# Patient Record
Sex: FEMALE | Race: BLACK OR AFRICAN AMERICAN | NOT HISPANIC OR LATINO | Employment: UNEMPLOYED | URBAN - METROPOLITAN AREA
[De-identification: names, ages, dates, MRNs, and addresses within clinical notes are randomized per-mention and may not be internally consistent; named-entity substitution may affect disease eponyms.]

---

## 2016-12-09 LAB
EXTERNAL ABO GROUPING: NORMAL
EXTERNAL ANTIBODY SCREEN: NEGATIVE
EXTERNAL HEPATITIS B SURFACE ANTIGEN: NEGATIVE
EXTERNAL RH FACTOR: POSITIVE
EXTERNAL RUBELLA QUALITATIVE: NORMAL
EXTERNAL SYPHILIS RPR SCREEN: NORMAL
EXTERNAL URINE DRUG SCREEN: NEGATIVE
HIV1 AB SPEC QL IA.RAPID: NEGATIVE

## 2017-01-05 RX ORDER — LANCETS
EACH MISCELLANEOUS
Qty: 1 EACH | Refills: 5 | Status: SHIPPED | OUTPATIENT
Start: 2017-01-05 | End: 2017-01-05 | Stop reason: SDUPTHER

## 2017-01-05 RX ORDER — LANCETS
EACH MISCELLANEOUS
Qty: 1 EACH | Refills: 5 | Status: SHIPPED | OUTPATIENT
Start: 2017-01-05 | End: 2017-01-13 | Stop reason: SDUPTHER

## 2017-01-10 ENCOUNTER — HOSPITAL ENCOUNTER (OUTPATIENT)
Dept: WOMENS IMAGING | Facility: HOSPITAL | Age: 22
Discharge: HOME | End: 2017-01-10
Attending: OBSTETRICS & GYNECOLOGY | Admitting: OBSTETRICS & GYNECOLOGY

## 2017-01-10 ENCOUNTER — OFFICE VISIT (OUTPATIENT)
Dept: OBSTETRICS AND GYNECOLOGY | Facility: HOSPITAL | Age: 22
End: 2017-01-10

## 2017-01-10 VITALS — WEIGHT: 207.8 LBS | DIASTOLIC BLOOD PRESSURE: 61 MMHG | SYSTOLIC BLOOD PRESSURE: 105 MMHG | BODY MASS INDEX: 36.81 KG/M2

## 2017-01-10 DIAGNOSIS — E11.9 TYPE 2 DIABETES MELLITUS WITHOUT COMPLICATION, WITHOUT LONG-TERM CURRENT USE OF INSULIN (HCC): ICD-10-CM

## 2017-01-10 DIAGNOSIS — O24.112 TYPE 2 DIABETES MELLITUS AFFECTING PREGNANCY IN SECOND TRIMESTER, ANTEPARTUM: ICD-10-CM

## 2017-01-10 DIAGNOSIS — Z98.891 HISTORY OF CESAREAN DELIVERY: Primary | ICD-10-CM

## 2017-01-10 DIAGNOSIS — O09.891 SHORT INTERVAL BETWEEN PREGNANCIES AFFECTING PREGNANCY IN FIRST TRIMESTER, ANTEPARTUM: ICD-10-CM

## 2017-01-10 PROCEDURE — 76811 OB US DETAILED SNGL FETUS: CPT | Performed by: OBSTETRICS & GYNECOLOGY

## 2017-01-10 PROCEDURE — 76811 OB US DETAILED SNGL FETUS: CPT

## 2017-01-10 NOTE — PROGRESS NOTES
Documentation of the ultrasound findings, images, and interpretations will be available in the patient's ViewPoint report located in the chart review imaging tab in [employer].

## 2017-01-10 NOTE — MR AVS SNAPSHOT
Jeanine Tabor   1/10/2017 9:00 AM   Office Visit    Dept Phone:  [phone]   Encounter #:  [id number]    Provider:  Glen Virk MD   Department:  [facility]                Your Full Care Plan              Today's Medication Changes          These changes are accurate as of: 1/10/17 10:20 AM.  If you have any questions, ask your nurse or doctor.               Medication(s)that have changed:     metFORMIN  MG 24 hr tablet   Commonly known as:  GLUCOPHAGE-XR   Take 1 tablet by mouth Daily With Breakfast.   What changed:  Another medication with the same name was removed. Continue taking this medication, and follow the directions you see here.   Changed by:  Ulices Thomas MD         Stop taking medication(s)listed here:     ferrous sulfate 325 (65 FE) MG tablet   Stopped by:  Glen Virk MD           ibuprofen 600 MG tablet   Commonly known as:  ADVIL,MOTRIN   Stopped by:  Glen Virk MD                      Your Updated Medication List          This list is accurate as of: 1/10/17 10:20 AM.  Always use your most recent med list.                ACCU-CHEK FASTCLIX LANCETS misc   Finger sticks to be done four times daily       ACCU-CHEK SMARTVIEW test strip   Generic drug:  glucose blood   Finger sticks four times daily       metFORMIN  MG 24 hr tablet   Commonly known as:  GLUCOPHAGE-XR   Take 1 tablet by mouth Daily With Breakfast.               You Were Diagnosed With        Codes Comments    History of  delivery    -  Primary ICD-10-CM: Z98.891  ICD-9-CM: V45.89     Short interval between pregnancies affecting pregnancy in first trimester, antepartum     ICD-10-CM: O09.891  ICD-9-CM: V23.89     Type 2 diabetes mellitus affecting pregnancy in second trimester, antepartum     ICD-10-CM: O24.112  ICD-9-CM: 648.03, 250.00       Instructions     None    Patient Instructions History      Upcoming Appointments     Visit Type Date Time Department    OB FOLLOWUP 1/10/2017 11:30 AM [facility] [place]    US [facility] 1/10/2017  8:45 AM  [facility] US PER DIAG CTR    NEW PATIENT 1/10/2017  9:00 AM [facility] [place]    FOLLOW UP 2017 10:45 AM [facility] [place]      MyChart Signup     [facility] [employer] allows you to send messages to your doctor, view your test results, renew your prescriptions, schedule appointments, and more. To sign up, go to [employer] and click on the Sign Up Now link in the New User? box. Enter your [employer] Activation Code exactly as it appears below along with the last four digits of your Social Security Number and your Date of Birth () to complete the sign-up process. If you do not sign up before the expiration date, you must request a new code.    [employer] Activation Code: [email or web address]  Expires: 2017 10:20 AM    If you have questions, you can email [email or web address] or call [phone] to talk to our [employer] staff. Remember, [employer] is NOT to be used for urgent needs. For medical emergencies, dial 911.               Other Info from Your Visit           Your Appointments     [place] 10, 2017 11:30 AM EST   OB FOLLOWUP with Ulices Thomas MD   [facility] OBSTETRICS AND GYNECOLOGY (--)    [address]  [facility] [address] [phone]            2017 10:45 AM EST   Follow Up with  [facility] DEPT SCHEDULE   [facility] (--)    [address]  [facility] [address]   [phone]           Arrive 15 minutes prior to appointment.              Allergies     No Known Allergies      Reason for Visit     Pregnancy Ultrasound T2DM; Previous C/S x2; Short pregnancy interval      Vital Signs     Blood Pressure Weight Last Menstrual Period Body Mass Index Smoking Status       105/61 207 lb 12.8 oz (94.3 kg) 2016 36.81 kg/m2 Never Smoker       Problems and Diagnoses Noted     Previous  ( delivery)    Short interval between  pregnancies affecting pregnancy in first trimester, antepartum    Type 2 diabetes mellitus affecting pregnancy in second trimester, antepartum

## 2017-01-10 NOTE — PROGRESS NOTES
Denies problems with pregnancy. Had Ten Sleep with low risk results. Admits did not check fingerstick glucose values prior to pregnancy and still not because she doesn't have testing supplies. Prescriptions may have been sent to a different pharmacy; she agrees to follow-up with Dr. Thomas regarding supplies and testing.

## 2017-01-13 ENCOUNTER — ROUTINE PRENATAL (OUTPATIENT)
Dept: OBSTETRICS AND GYNECOLOGY | Facility: CLINIC | Age: 22
End: 2017-01-13

## 2017-01-13 VITALS — DIASTOLIC BLOOD PRESSURE: 74 MMHG | SYSTOLIC BLOOD PRESSURE: 122 MMHG | BODY MASS INDEX: 37.2 KG/M2 | WEIGHT: 210 LBS

## 2017-01-13 DIAGNOSIS — Z98.891 HISTORY OF CESAREAN DELIVERY: Primary | ICD-10-CM

## 2017-01-13 PROCEDURE — 99213 OFFICE O/P EST LOW 20 MIN: CPT | Performed by: OBSTETRICS & GYNECOLOGY

## 2017-01-13 RX ORDER — METFORMIN HYDROCHLORIDE 500 MG/1
500 TABLET, EXTENDED RELEASE ORAL
Qty: 90 TABLET | Refills: 2 | Status: SHIPPED | OUTPATIENT
Start: 2017-01-13 | End: 2017-06-09 | Stop reason: HOSPADM

## 2017-01-13 RX ORDER — LANCETS
EACH MISCELLANEOUS
Qty: 1 EACH | Refills: 5 | Status: SHIPPED | OUTPATIENT
Start: 2017-01-13

## 2017-01-13 RX ORDER — BLOOD SUGAR DIAGNOSTIC
STRIP MISCELLANEOUS
Qty: 1 EACH | Refills: 6 | Status: SHIPPED | OUTPATIENT
Start: 2017-01-13 | End: 2017-06-09 | Stop reason: HOSPADM

## 2017-01-13 NOTE — PROGRESS NOTES
Chief Complaint   Patient presents with   • Routine Prenatal Visit     No concern this visit.       HPI: Jeanine is a  currently at 18w3d who today reports the following:Headache - No ; Visual change - No ; Swelling in legs - No ; Nausea - No ; Constipation - No; Diarrhea - No ; Contractions - No ; Leaking fluid - No ; Vaginal bleeding -  No.      ROS: Constitutional: negative for fever, chills, activity change, appetite change, fatigue and unexpected weight change.  Respiratory: negative  Cardiovascular: negative  Gastrointestinal: negative  Genitourinary:negative  Vitals: See prenatal flowsheet     EXAM:  Abdomen: See prenatal flowsheet   Urine glucose/protein: See prenatal flowsheet   Pelvic: See prenatal flowsheet     Prenatal Labs  Lab Results   Component Value Date    HGB 12.9 2016    RUBELLAIGGIN Immune 2016    HEPBSAG Non-Reactive 2016    LABRPR Non-reactive 10/23/2015    ABORH B Rh Positive 2016    ABO B 2016    RH Positive 2016    ABSCRN Negative 2016    LABANTI Negative 10/23/2015    [id number] NonReactive 10/23/2015    [id number] Non-Reactive 2016       MDM:  Impression:Diabetes mellitus  Tests done today: none  Specific topics discussed at today's visit: glucose monitoroing  Next visit:U/S to complete the anatomic screening

## 2017-01-13 NOTE — MR AVS SNAPSHOT
Jeanine Tabor   2017 11:20 AM   Routine Prenatal    Dept Phone:  [phone]   Encounter #:  [id number]    Provider:  Ulices Thomas MD   Department:  [facility] OBSTETRICS AND GYNECOLOGY                Your Full Care Plan              Where to Get Your Medications      [facility] [place] [address] - [address] [place] [address] - [phone]  - [phone] [address] [address]     Phone:  [phone]     ACCU-CHEK FASTCLIX LANCETS misc    ACCU-CHEK SMARTVIEW test strip    metFORMIN  MG 24 hr tablet            Your Updated Medication List          This list is accurate as of: 17 12:30 PM.  Always use your most recent med list.                ACCU-CHEK FASTCLIX LANCETS misc   Finger sticks to be done four times daily       ACCU-CHEK SMARTVIEW test strip   Generic drug:  glucose blood   Finger sticks four times daily       metFORMIN  MG 24 hr tablet   Commonly known as:  GLUCOPHAGE-XR   Take 1 tablet by mouth Daily With Breakfast.               You Were Diagnosed With        Codes Comments    History of  delivery    -  Primary ICD-10-CM: Z98.891  ICD-9-CM: V45.89       Instructions     None    Patient Instructions History      Upcoming Appointments     Visit Type Date Time Department    OB FOLLOWUP 2017 11:20 AM [facility] [place]    FOLLOW UP 2017 10:45 AM [facility] [place]    [facility] 2017 11:00 AM  [facility] US PER [facility]      [employer] Signup     [facility] [employer] allows you to send messages to your doctor, view your test results, renew your prescriptions, schedule appointments, and more. To sign up, go to [employer] and click on the Sign Up Now link in the New User? box. Enter your [employer] Activation Code exactly as it appears below along with the last four digits of your Social Security Number and your Date of Birth () to complete the  sign-up process. If you do not sign up before the expiration date, you must request a new code.    [employer] Activation Code: [email or web address]  Expires: 2017 10:20 AM    If you have questions, you can email [email or web address] or call [phone] to talk to our [employer] staff. Remember, [employer] is NOT to be used for urgent needs. For medical emergencies, dial 911.               Other Info from Your Visit           Your Appointments     2017 10:45 AM EST   Follow Up with  [facility] DEPT SCHEDULE   [facility] (--)    [address] [id number] [phone]           Arrive 15 minutes prior to appointment.            2017 11:00 AM EST   US cecy pdc with [facility] US 1   [facility] PER DIAG CTR ([place])    [address] [address] [address] [phone]           Patient is to be hydrated              Allergies     No Known Allergies      Reason for Visit     Routine Prenatal Visit No concern this visit.      Vital Signs     Blood Pressure Weight Last Menstrual Period Body Mass Index Smoking Status       122/74 210 lb (95.3 kg) 2016 37.2 kg/m2 Never Smoker       Problems and Diagnoses Noted     Previous  ( delivery)

## 2017-02-07 ENCOUNTER — ROUTINE PRENATAL (OUTPATIENT)
Dept: OBSTETRICS AND GYNECOLOGY | Facility: CLINIC | Age: 22
End: 2017-02-07

## 2017-02-07 ENCOUNTER — HOSPITAL ENCOUNTER (OUTPATIENT)
Dept: WOMENS IMAGING | Facility: HOSPITAL | Age: 22
Discharge: HOME | End: 2017-02-07
Attending: OBSTETRICS & GYNECOLOGY | Admitting: OBSTETRICS & GYNECOLOGY

## 2017-02-07 ENCOUNTER — OFFICE VISIT (OUTPATIENT)
Dept: OBSTETRICS AND GYNECOLOGY | Facility: HOSPITAL | Age: 22
End: 2017-02-07

## 2017-02-07 VITALS — SYSTOLIC BLOOD PRESSURE: 108 MMHG | WEIGHT: 211.6 LBS | DIASTOLIC BLOOD PRESSURE: 54 MMHG | BODY MASS INDEX: 37.48 KG/M2

## 2017-02-07 VITALS — BODY MASS INDEX: 37.38 KG/M2 | WEIGHT: 211 LBS | SYSTOLIC BLOOD PRESSURE: 118 MMHG | DIASTOLIC BLOOD PRESSURE: 84 MMHG

## 2017-02-07 DIAGNOSIS — O24.112 TYPE 2 DIABETES MELLITUS AFFECTING PREGNANCY IN SECOND TRIMESTER, ANTEPARTUM: ICD-10-CM

## 2017-02-07 DIAGNOSIS — Z98.891 HISTORY OF CESAREAN DELIVERY: Primary | ICD-10-CM

## 2017-02-07 DIAGNOSIS — Z98.891 HISTORY OF CESAREAN DELIVERY: ICD-10-CM

## 2017-02-07 DIAGNOSIS — O09.891 SHORT INTERVAL BETWEEN PREGNANCIES AFFECTING PREGNANCY IN FIRST TRIMESTER, ANTEPARTUM: ICD-10-CM

## 2017-02-07 DIAGNOSIS — O24.112 TYPE 2 DIABETES MELLITUS AFFECTING PREGNANCY IN SECOND TRIMESTER, ANTEPARTUM: Primary | ICD-10-CM

## 2017-02-07 DIAGNOSIS — Z82.79 FAMILY HISTORY OF CONGENITAL HEART DEFECT: ICD-10-CM

## 2017-02-07 PROCEDURE — 76825 ECHO EXAM OF FETAL HEART: CPT

## 2017-02-07 PROCEDURE — 93325 DOPPLER ECHO COLOR FLOW MAPG: CPT

## 2017-02-07 PROCEDURE — 76825 ECHO EXAM OF FETAL HEART: CPT | Performed by: OBSTETRICS & GYNECOLOGY

## 2017-02-07 PROCEDURE — 99213 OFFICE O/P EST LOW 20 MIN: CPT | Performed by: OBSTETRICS & GYNECOLOGY

## 2017-02-07 PROCEDURE — 76827 ECHO EXAM OF FETAL HEART: CPT

## 2017-02-07 PROCEDURE — 93325 DOPPLER ECHO COLOR FLOW MAPG: CPT | Performed by: OBSTETRICS & GYNECOLOGY

## 2017-02-07 RX ORDER — PRENATAL WITH FERROUS FUM AND FOLIC ACID 3080; 920; 120; 400; 22; 1.84; 3; 20; 10; 1; 12; 200; 27; 25; 2 [IU]/1; [IU]/1; MG/1; [IU]/1; MG/1; MG/1; MG/1; MG/1; MG/1; MG/1; UG/1; MG/1; MG/1; MG/1; MG/1
1 TABLET ORAL DAILY
COMMUNITY
End: 2017-08-07

## 2017-02-07 NOTE — PROGRESS NOTES
Chief Complaint   Patient presents with   • Routine Prenatal Visit     OB Patient. Was seen at [facility] today. C/o Headaches off and on has not tried any meds for relief.        HPI: Jeanine is a  currently at 22w0d who today reports the following:Headache - No ; Visual change - No ; Swelling in legs - No ; Nausea - No ; Constipation - No; Diarrhea - No ; Contractions - No ; Leaking fluid - No ; Vaginal bleeding -  No.      ROS: Constitutional: negative for fever, chills, activity change, appetite change, fatigue and unexpected weight change.  Respiratory: negative  Cardiovascular: negative  Gastrointestinal: negative  Genitourinary:negative  Hematologic/lymphatic: negative  Musculoskeletal:negative  Vitals: See prenatal flowsheet     EXAM:  Abdomen: See prenatal flowsheet   Urine glucose/protein: See prenatal flowsheet   Pelvic: See prenatal flowsheet     Prenatal Labs  Lab Results   Component Value Date    HGB 12.9 2016    RUBELLAIGGIN Immune 2016    HEPBSAG Non-Reactive 2016    LABRPR Non-reactive 10/23/2015    ABORH B Rh Positive 2016    ABO B 2016    RH Positive 2016    ABSCRN Negative 2016    LABANTI Negative 10/23/2015    [id number] NonReactive 10/23/2015    ARQ5QBK3 Non-Reactive 2016       MDM:  Impression:Uncomplicated multigravida and Diabetes mellitus  Tests done today: U/S to complete the anatomic screening   Specific topics discussed at today's visit: glucose control and diet  Next visit:none, PDC six weeks

## 2017-03-07 ENCOUNTER — ROUTINE PRENATAL (OUTPATIENT)
Dept: OBSTETRICS AND GYNECOLOGY | Facility: CLINIC | Age: 22
End: 2017-03-07

## 2017-03-07 VITALS — SYSTOLIC BLOOD PRESSURE: 120 MMHG | DIASTOLIC BLOOD PRESSURE: 66 MMHG | WEIGHT: 212 LBS | BODY MASS INDEX: 37.55 KG/M2

## 2017-03-07 DIAGNOSIS — O24.112 TYPE 2 DIABETES MELLITUS AFFECTING PREGNANCY IN SECOND TRIMESTER, ANTEPARTUM: Primary | ICD-10-CM

## 2017-03-07 DIAGNOSIS — Z98.891 HISTORY OF CESAREAN DELIVERY: ICD-10-CM

## 2017-03-07 DIAGNOSIS — Z82.79 FAMILY HISTORY OF CONGENITAL HEART DEFECT: ICD-10-CM

## 2017-03-07 PROCEDURE — 99213 OFFICE O/P EST LOW 20 MIN: CPT | Performed by: OBSTETRICS & GYNECOLOGY

## 2017-03-07 NOTE — PROGRESS NOTES
Chief Complaint   Patient presents with   • Routine Prenatal Visit     ob visit        HPI: Jeanine is a  currently at 26w0d who today reports the following:Headache - No ; Visual change - No ; Swelling in legs - No ; Nausea - No ; Constipation - No; Diarrhea - No ; Contractions - No ; Leaking fluid - No ; Vaginal bleeding -  No.      ROS: Constitutional: negative for fever, chills, activity change, appetite change, fatigue and unexpected weight change.  Ears, nose, mouth, throat, and face: negative  Respiratory: negative  Cardiovascular: negative  Gastrointestinal: negative  Genitourinary:negative  Vitals: See prenatal flowsheet     EXAM:  Abdomen: See prenatal flowsheet   Urine glucose/protein: See prenatal flowsheet   Pelvic: See prenatal flowsheet     Prenatal Labs  Lab Results   Component Value Date    HGB 12.9 2016    RUBELLAIGGIN Immune 2016    HEPBSAG Non-Reactive 2016    LABRPR Non-reactive 10/23/2015    ABORH B Rh Positive 2016    ABO B 2016    RH Positive 2016    ABSCRN Negative 2016    LABANTI Negative 10/23/2015    [id number] NonReactive 10/23/2015    [id number] Non-Reactive 2016       MDM:  Impression:Uncomplicated multigravida, Previous C/S with planned repeat C/S and Diabetes mellitus  Tests done today: none  Specific topics discussed at today's visit: none - she had no major complaints,questions or concerns  Next visit:U/S for EFW

## 2017-03-21 ENCOUNTER — OFFICE VISIT (OUTPATIENT)
Dept: OBSTETRICS AND GYNECOLOGY | Facility: HOSPITAL | Age: 22
End: 2017-03-21

## 2017-03-21 ENCOUNTER — HOSPITAL ENCOUNTER (OUTPATIENT)
Dept: WOMENS IMAGING | Facility: HOSPITAL | Age: 22
Discharge: HOME | End: 2017-03-21
Attending: OBSTETRICS & GYNECOLOGY | Admitting: OBSTETRICS & GYNECOLOGY

## 2017-03-21 VITALS — BODY MASS INDEX: 38.09 KG/M2 | DIASTOLIC BLOOD PRESSURE: 58 MMHG | WEIGHT: 215 LBS | SYSTOLIC BLOOD PRESSURE: 116 MMHG

## 2017-03-21 DIAGNOSIS — Z98.891 HISTORY OF CESAREAN DELIVERY: ICD-10-CM

## 2017-03-21 DIAGNOSIS — O24.112 TYPE 2 DIABETES MELLITUS AFFECTING PREGNANCY IN SECOND TRIMESTER, ANTEPARTUM: Primary | ICD-10-CM

## 2017-03-21 DIAGNOSIS — O24.112 TYPE 2 DIABETES MELLITUS AFFECTING PREGNANCY IN SECOND TRIMESTER, ANTEPARTUM: ICD-10-CM

## 2017-03-21 DIAGNOSIS — O09.891 SHORT INTERVAL BETWEEN PREGNANCIES AFFECTING PREGNANCY IN FIRST TRIMESTER, ANTEPARTUM: ICD-10-CM

## 2017-03-21 DIAGNOSIS — Z82.79 FAMILY HISTORY OF CONGENITAL HEART DEFECT: ICD-10-CM

## 2017-03-21 PROCEDURE — 76816 OB US FOLLOW-UP PER FETUS: CPT

## 2017-03-21 PROCEDURE — 76816 OB US FOLLOW-UP PER FETUS: CPT | Performed by: OBSTETRICS & GYNECOLOGY

## 2017-03-21 NOTE — PROGRESS NOTES
"Pt denies all s/s PTL, \"reporting glucose readings to Dr. Thomas\", states \"fasting= and post meals 120-130\",to see William couch, [place]=low prob, male  "

## 2017-03-21 NOTE — PROGRESS NOTES
Documentation of the ultasound findings, images, and interpretations with be available in the patient's Viewpoint report located in the Chart Review Imaging tab in [employer].

## 2017-03-28 ENCOUNTER — ROUTINE PRENATAL (OUTPATIENT)
Dept: OBSTETRICS AND GYNECOLOGY | Facility: CLINIC | Age: 22
End: 2017-03-28

## 2017-03-28 VITALS — WEIGHT: 215 LBS | DIASTOLIC BLOOD PRESSURE: 78 MMHG | SYSTOLIC BLOOD PRESSURE: 120 MMHG | BODY MASS INDEX: 38.09 KG/M2

## 2017-03-28 DIAGNOSIS — Z98.891 HISTORY OF CESAREAN DELIVERY: Primary | ICD-10-CM

## 2017-03-28 DIAGNOSIS — O24.112 TYPE 2 DIABETES MELLITUS AFFECTING PREGNANCY IN SECOND TRIMESTER, ANTEPARTUM: ICD-10-CM

## 2017-03-28 DIAGNOSIS — Z3A.29 29 WEEKS GESTATION OF PREGNANCY: ICD-10-CM

## 2017-03-28 PROCEDURE — 99213 OFFICE O/P EST LOW 20 MIN: CPT | Performed by: OBSTETRICS & GYNECOLOGY

## 2017-03-28 NOTE — PROGRESS NOTES
Chief Complaint   Patient presents with   • Routine Prenatal Visit     ob visit patient wants to sign tubal forms       HPI: Jeanine is a  currently at 29w0d who today reports the following:Headache - No ; Visual change - No ; Swelling in legs - No ; Nausea - No ; Constipation - No; Diarrhea - No ; Contractions - No ; Leaking fluid - No ; Vaginal bleeding -  No.      ROS: Constitutional: negative for fever, chills, activity change, appetite change, fatigue and unexpected weight change.  Ears, nose, mouth, throat, and face: negative  Respiratory: negative  Cardiovascular: negative  Gastrointestinal: negative  Genitourinary:negative  Vitals: See prenatal flowsheet     EXAM:  Abdomen: See prenatal flowsheet   Urine glucose/protein: See prenatal flowsheet   Pelvic: See prenatal flowsheet     Prenatal Labs  Lab Results   Component Value Date    HGB 12.9 2016    RUBELLAIGGIN Immune 2016    HEPBSAG Non-Reactive 2016    LABRPR Non-reactive 10/23/2015    ABORH B Rh Positive 2016    ABO B 2016    RH Positive 2016    ABSCRN Negative 2016    LABANTI Negative 10/23/2015    [id number] NonReactive 10/23/2015    [id number] Non-Reactive 2016       MDM:  Impression:Previous C/S with planned repeat C/S, Diabetes mellitus and desires sterilzation  Tests done today: none  Specific topics discussed at today's visit: glucose control, sterilization  Next visit:U/S to complete the anatomic screening

## 2017-04-18 ENCOUNTER — OFFICE VISIT (OUTPATIENT)
Dept: OBSTETRICS AND GYNECOLOGY | Facility: HOSPITAL | Age: 22
End: 2017-04-18

## 2017-04-18 ENCOUNTER — HOSPITAL ENCOUNTER (OUTPATIENT)
Dept: WOMENS IMAGING | Facility: HOSPITAL | Age: 22
Discharge: HOME | End: 2017-04-18
Attending: OBSTETRICS & GYNECOLOGY | Admitting: OBSTETRICS & GYNECOLOGY

## 2017-04-18 ENCOUNTER — ROUTINE PRENATAL (OUTPATIENT)
Dept: OBSTETRICS AND GYNECOLOGY | Facility: CLINIC | Age: 22
End: 2017-04-18

## 2017-04-18 VITALS — SYSTOLIC BLOOD PRESSURE: 116 MMHG | DIASTOLIC BLOOD PRESSURE: 59 MMHG | BODY MASS INDEX: 38.09 KG/M2 | WEIGHT: 215 LBS

## 2017-04-18 VITALS — DIASTOLIC BLOOD PRESSURE: 62 MMHG | SYSTOLIC BLOOD PRESSURE: 110 MMHG | WEIGHT: 215 LBS | BODY MASS INDEX: 38.09 KG/M2

## 2017-04-18 DIAGNOSIS — O24.113 TYPE 2 DIABETES MELLITUS AFFECTING PREGNANCY IN THIRD TRIMESTER, ANTEPARTUM: Primary | ICD-10-CM

## 2017-04-18 DIAGNOSIS — Z82.79 FAMILY HISTORY OF CONGENITAL HEART DEFECT: ICD-10-CM

## 2017-04-18 DIAGNOSIS — O09.891 SHORT INTERVAL BETWEEN PREGNANCIES AFFECTING PREGNANCY IN FIRST TRIMESTER, ANTEPARTUM: ICD-10-CM

## 2017-04-18 DIAGNOSIS — O24.113 TYPE 2 DIABETES MELLITUS AFFECTING PREGNANCY IN THIRD TRIMESTER, ANTEPARTUM: ICD-10-CM

## 2017-04-18 DIAGNOSIS — O24.112 TYPE 2 DIABETES MELLITUS AFFECTING PREGNANCY IN SECOND TRIMESTER, ANTEPARTUM: ICD-10-CM

## 2017-04-18 DIAGNOSIS — Z98.891 HISTORY OF CESAREAN DELIVERY: Primary | ICD-10-CM

## 2017-04-18 PROBLEM — Z3A.32 32 WEEKS GESTATION OF PREGNANCY: Status: ACTIVE | Noted: 2017-03-28

## 2017-04-18 PROCEDURE — 76816 OB US FOLLOW-UP PER FETUS: CPT

## 2017-04-18 PROCEDURE — 76816 OB US FOLLOW-UP PER FETUS: CPT | Performed by: OBSTETRICS & GYNECOLOGY

## 2017-04-18 PROCEDURE — 99213 OFFICE O/P EST LOW 20 MIN: CPT | Performed by: OBSTETRICS & GYNECOLOGY

## 2017-04-18 NOTE — PROGRESS NOTES
Chief Complaint   Patient presents with   • Routine Prenatal Visit     ob visit        HPI: Jeanine is a  currently at 32w0d who today reports the following:Headache - No ; Visual change - No ; Swelling in legs - No ; Nausea - No ; Constipation - No; Diarrhea - No ; Contractions - No ; Leaking fluid - No ; Vaginal bleeding -  No.      ROS: A comprehensive review of systems was negative.  Vitals: See prenatal flowsheet     EXAM:  Abdomen: See prenatal flowsheet   Urine glucose/protein: See prenatal flowsheet   Pelvic: See prenatal flowsheet     Prenatal Labs  Lab Results   Component Value Date    HGB 12.9 2016    RUBELLAIGGIN Immune 2016    HEPBSAG Non-Reactive 2016    LABRPR Non-reactive 10/23/2015    ABORH B Rh Positive 2016    ABO B 2016    RH Positive 2016    ABSCRN Negative 2016    LABANTI Negative 10/23/2015    [id number] NonReactive 10/23/2015    [id number] Non-Reactive 2016       MDM:  Impression:Previous C/S with planned repeat C/S and Diabetes mellitus  Tests done today: U/S for EFW   Specific topics discussed at today's visit: birth plan  glucose monitoring. fetal well-being testing, delivery  Next visit:NST

## 2017-04-18 NOTE — PROGRESS NOTES
"Pt reports that \"she fell yesterday on rt hip/side\". Denies \"bleeding/vaginal fluid leakage or regular UC's and +fetal movement\". States \"called William yesterday after fall and was told ok to wait til today unless developed any s/s PTL,\". Reports \"fasting glucoses in 90's and post meals 130's\". Reports William managing DM. To see William next.  "

## 2017-04-21 ENCOUNTER — RESULTS ENCOUNTER (OUTPATIENT)
Dept: OBSTETRICS AND GYNECOLOGY | Facility: CLINIC | Age: 22
End: 2017-04-21

## 2017-04-21 DIAGNOSIS — O24.113 TYPE 2 DIABETES MELLITUS AFFECTING PREGNANCY IN THIRD TRIMESTER, ANTEPARTUM: ICD-10-CM

## 2017-04-25 ENCOUNTER — RESULTS ENCOUNTER (OUTPATIENT)
Dept: OBSTETRICS AND GYNECOLOGY | Facility: CLINIC | Age: 22
End: 2017-04-25

## 2017-04-25 DIAGNOSIS — O24.113 TYPE 2 DIABETES MELLITUS AFFECTING PREGNANCY IN THIRD TRIMESTER, ANTEPARTUM: ICD-10-CM

## 2017-04-28 ENCOUNTER — RESULTS ENCOUNTER (OUTPATIENT)
Dept: OBSTETRICS AND GYNECOLOGY | Facility: CLINIC | Age: 22
End: 2017-04-28

## 2017-04-28 DIAGNOSIS — O24.113 TYPE 2 DIABETES MELLITUS AFFECTING PREGNANCY IN THIRD TRIMESTER, ANTEPARTUM: ICD-10-CM

## 2017-05-02 ENCOUNTER — ROUTINE PRENATAL (OUTPATIENT)
Dept: OBSTETRICS AND GYNECOLOGY | Facility: CLINIC | Age: 22
End: 2017-05-02

## 2017-05-02 ENCOUNTER — RESULTS ENCOUNTER (OUTPATIENT)
Dept: OBSTETRICS AND GYNECOLOGY | Facility: CLINIC | Age: 22
End: 2017-05-02

## 2017-05-02 VITALS — DIASTOLIC BLOOD PRESSURE: 60 MMHG | WEIGHT: 213 LBS | SYSTOLIC BLOOD PRESSURE: 110 MMHG | BODY MASS INDEX: 37.73 KG/M2

## 2017-05-02 DIAGNOSIS — Z3A.34 34 WEEKS GESTATION OF PREGNANCY: ICD-10-CM

## 2017-05-02 DIAGNOSIS — Z98.891 HISTORY OF CESAREAN DELIVERY: Primary | ICD-10-CM

## 2017-05-02 DIAGNOSIS — O24.113 TYPE 2 DIABETES MELLITUS AFFECTING PREGNANCY IN THIRD TRIMESTER, ANTEPARTUM: ICD-10-CM

## 2017-05-02 LAB
ACCELERATIONS > 15 BPM: 3
NST ASSESSMENT: REACTIVE
NST BASELINE: 140
NST DURATION: 20 MINUTES
NST INDICATIONS: NORMAL
NST LOCATIONS: NORMAL
NST READ BY: NORMAL
NST RETURN: NORMAL

## 2017-05-02 PROCEDURE — 99212 OFFICE O/P EST SF 10 MIN: CPT | Performed by: OBSTETRICS & GYNECOLOGY

## 2017-05-02 PROCEDURE — 59025 FETAL NON-STRESS TEST: CPT | Performed by: OBSTETRICS & GYNECOLOGY

## 2017-05-05 ENCOUNTER — RESULTS ENCOUNTER (OUTPATIENT)
Dept: OBSTETRICS AND GYNECOLOGY | Facility: CLINIC | Age: 22
End: 2017-05-05

## 2017-05-05 DIAGNOSIS — O24.113 TYPE 2 DIABETES MELLITUS AFFECTING PREGNANCY IN THIRD TRIMESTER, ANTEPARTUM: ICD-10-CM

## 2017-05-08 ENCOUNTER — ROUTINE PRENATAL (OUTPATIENT)
Dept: OBSTETRICS AND GYNECOLOGY | Facility: CLINIC | Age: 22
End: 2017-05-08

## 2017-05-08 VITALS — BODY MASS INDEX: 37.73 KG/M2 | WEIGHT: 213 LBS | SYSTOLIC BLOOD PRESSURE: 120 MMHG | DIASTOLIC BLOOD PRESSURE: 68 MMHG

## 2017-05-08 DIAGNOSIS — O24.113 TYPE 2 DIABETES MELLITUS AFFECTING PREGNANCY IN THIRD TRIMESTER, ANTEPARTUM: Primary | ICD-10-CM

## 2017-05-08 LAB
ACCELERATIONS > 15 BPM: 2
ACOUSTIC STIMULATION: NO
NST ASSESSMENT: REACTIVE
NST BASELINE: 135
NST INDICATIONS: NORMAL
NST LOCATIONS: NORMAL
NST READ BY: NORMAL
NST RETURN: NORMAL
UTERINE ACTIVITY: NO

## 2017-05-08 PROCEDURE — 59025 FETAL NON-STRESS TEST: CPT | Performed by: OBSTETRICS & GYNECOLOGY

## 2017-05-08 PROCEDURE — 99213 OFFICE O/P EST LOW 20 MIN: CPT | Performed by: OBSTETRICS & GYNECOLOGY

## 2017-05-09 ENCOUNTER — RESULTS ENCOUNTER (OUTPATIENT)
Dept: OBSTETRICS AND GYNECOLOGY | Facility: CLINIC | Age: 22
End: 2017-05-09

## 2017-05-09 DIAGNOSIS — O24.113 TYPE 2 DIABETES MELLITUS AFFECTING PREGNANCY IN THIRD TRIMESTER, ANTEPARTUM: ICD-10-CM

## 2017-05-10 ENCOUNTER — TELEPHONE (OUTPATIENT)
Dept: OBSTETRICS AND GYNECOLOGY | Facility: CLINIC | Age: 22
End: 2017-05-10

## 2017-05-12 ENCOUNTER — RESULTS ENCOUNTER (OUTPATIENT)
Dept: OBSTETRICS AND GYNECOLOGY | Facility: CLINIC | Age: 22
End: 2017-05-12

## 2017-05-12 DIAGNOSIS — O24.113 TYPE 2 DIABETES MELLITUS AFFECTING PREGNANCY IN THIRD TRIMESTER, ANTEPARTUM: ICD-10-CM

## 2017-05-16 ENCOUNTER — HOSPITAL ENCOUNTER (OUTPATIENT)
Dept: WOMENS IMAGING | Facility: HOSPITAL | Age: 22
Discharge: HOME | End: 2017-05-16
Attending: OBSTETRICS & GYNECOLOGY | Admitting: OBSTETRICS & GYNECOLOGY

## 2017-05-16 ENCOUNTER — OFFICE VISIT (OUTPATIENT)
Dept: OBSTETRICS AND GYNECOLOGY | Facility: HOSPITAL | Age: 22
End: 2017-05-16

## 2017-05-16 ENCOUNTER — RESULTS ENCOUNTER (OUTPATIENT)
Dept: OBSTETRICS AND GYNECOLOGY | Facility: CLINIC | Age: 22
End: 2017-05-16

## 2017-05-16 VITALS — BODY MASS INDEX: 38.37 KG/M2 | SYSTOLIC BLOOD PRESSURE: 111 MMHG | WEIGHT: 216.6 LBS | DIASTOLIC BLOOD PRESSURE: 55 MMHG

## 2017-05-16 DIAGNOSIS — O09.891 SHORT INTERVAL BETWEEN PREGNANCIES AFFECTING PREGNANCY IN FIRST TRIMESTER, ANTEPARTUM: ICD-10-CM

## 2017-05-16 DIAGNOSIS — O24.113 TYPE 2 DIABETES MELLITUS AFFECTING PREGNANCY IN THIRD TRIMESTER, ANTEPARTUM: ICD-10-CM

## 2017-05-16 DIAGNOSIS — O24.113 TYPE 2 DIABETES MELLITUS AFFECTING PREGNANCY IN THIRD TRIMESTER, ANTEPARTUM: Primary | ICD-10-CM

## 2017-05-16 DIAGNOSIS — Z98.891 HISTORY OF CESAREAN DELIVERY: ICD-10-CM

## 2017-05-16 PROBLEM — O24.112 TYPE 2 DIABETES MELLITUS AFFECTING PREGNANCY IN SECOND TRIMESTER, ANTEPARTUM: Status: RESOLVED | Noted: 2017-01-10 | Resolved: 2017-05-16

## 2017-05-16 PROCEDURE — 76819 FETAL BIOPHYS PROFIL W/O NST: CPT

## 2017-05-16 PROCEDURE — 76819 FETAL BIOPHYS PROFIL W/O NST: CPT | Performed by: OBSTETRICS & GYNECOLOGY

## 2017-05-16 PROCEDURE — 76816 OB US FOLLOW-UP PER FETUS: CPT | Performed by: OBSTETRICS & GYNECOLOGY

## 2017-05-16 PROCEDURE — 76816 OB US FOLLOW-UP PER FETUS: CPT

## 2017-05-19 ENCOUNTER — RESULTS ENCOUNTER (OUTPATIENT)
Dept: OBSTETRICS AND GYNECOLOGY | Facility: CLINIC | Age: 22
End: 2017-05-19

## 2017-05-19 DIAGNOSIS — O24.113 TYPE 2 DIABETES MELLITUS AFFECTING PREGNANCY IN THIRD TRIMESTER, ANTEPARTUM: ICD-10-CM

## 2017-05-22 ENCOUNTER — ROUTINE PRENATAL (OUTPATIENT)
Dept: OBSTETRICS AND GYNECOLOGY | Facility: CLINIC | Age: 22
End: 2017-05-22

## 2017-05-22 VITALS — SYSTOLIC BLOOD PRESSURE: 110 MMHG | WEIGHT: 216 LBS | DIASTOLIC BLOOD PRESSURE: 68 MMHG | BODY MASS INDEX: 38.26 KG/M2

## 2017-05-22 DIAGNOSIS — Z34.83 PRENATAL CARE, SUBSEQUENT PREGNANCY, THIRD TRIMESTER: ICD-10-CM

## 2017-05-22 DIAGNOSIS — O24.113 TYPE 2 DIABETES MELLITUS AFFECTING PREGNANCY IN THIRD TRIMESTER, ANTEPARTUM: Primary | ICD-10-CM

## 2017-05-22 LAB
ACCELERATIONS > 15 BPM: 3
ACOUSTIC STIMULATION: NO
NST ASSESSMENT: REACTIVE
NST BASELINE: 135
NST DURATION: 20 MINUTES
NST INDICATIONS: NORMAL
NST LOCATIONS: NORMAL
NST READ BY: NORMAL
NST RETURN: NORMAL
UTERINE ACTIVITY: NO

## 2017-05-22 PROCEDURE — 59025 FETAL NON-STRESS TEST: CPT | Performed by: OBSTETRICS & GYNECOLOGY

## 2017-05-22 PROCEDURE — 99213 OFFICE O/P EST LOW 20 MIN: CPT | Performed by: OBSTETRICS & GYNECOLOGY

## 2017-05-22 RX ORDER — LIDOCAINE HYDROCHLORIDE 10 MG/ML
5 INJECTION, SOLUTION INFILTRATION; PERINEURAL AS NEEDED
Status: CANCELLED | OUTPATIENT
Start: 2017-05-22

## 2017-05-22 RX ORDER — CARBOPROST TROMETHAMINE 250 UG/ML
250 INJECTION, SOLUTION INTRAMUSCULAR AS NEEDED
Status: CANCELLED | OUTPATIENT
Start: 2017-05-22

## 2017-05-22 RX ORDER — SODIUM CHLORIDE 0.9 % (FLUSH) 0.9 %
1-10 SYRINGE (ML) INJECTION AS NEEDED
Status: CANCELLED | OUTPATIENT
Start: 2017-05-22

## 2017-05-22 RX ORDER — METHYLERGONOVINE MALEATE 0.2 MG/ML
200 INJECTION INTRAVENOUS ONCE AS NEEDED
Status: CANCELLED | OUTPATIENT
Start: 2017-05-22

## 2017-05-22 RX ORDER — MISOPROSTOL 100 UG/1
800 TABLET ORAL AS NEEDED
Status: CANCELLED | OUTPATIENT
Start: 2017-05-22

## 2017-05-22 RX ORDER — SODIUM CHLORIDE, SODIUM LACTATE, POTASSIUM CHLORIDE, CALCIUM CHLORIDE 600; 310; 30; 20 MG/100ML; MG/100ML; MG/100ML; MG/100ML
125 INJECTION, SOLUTION INTRAVENOUS CONTINUOUS
Status: CANCELLED | OUTPATIENT
Start: 2017-05-22

## 2017-05-23 ENCOUNTER — RESULTS ENCOUNTER (OUTPATIENT)
Dept: OBSTETRICS AND GYNECOLOGY | Facility: CLINIC | Age: 22
End: 2017-05-23

## 2017-05-23 DIAGNOSIS — O24.113 TYPE 2 DIABETES MELLITUS AFFECTING PREGNANCY IN THIRD TRIMESTER, ANTEPARTUM: ICD-10-CM

## 2017-05-31 ENCOUNTER — ROUTINE PRENATAL (OUTPATIENT)
Dept: OBSTETRICS AND GYNECOLOGY | Facility: CLINIC | Age: 22
End: 2017-05-31

## 2017-05-31 VITALS — BODY MASS INDEX: 38.09 KG/M2 | SYSTOLIC BLOOD PRESSURE: 110 MMHG | DIASTOLIC BLOOD PRESSURE: 76 MMHG | WEIGHT: 215 LBS

## 2017-05-31 DIAGNOSIS — O24.113 TYPE 2 DIABETES MELLITUS AFFECTING PREGNANCY IN THIRD TRIMESTER, ANTEPARTUM: Primary | ICD-10-CM

## 2017-05-31 LAB
ACCELERATIONS > 15 BPM: 5
ACOUSTIC STIMULATION: NO
DECELERATIONS: NORMAL
FHR VARIABILITIES: NORMAL
NST ASSESSMENT: REACTIVE
NST BASELINE: 135
NST DURATION: 20 MINUTES
NST INDICATIONS: NORMAL
NST LOCATIONS: NORMAL
NST READ BY: NORMAL
NST RETURN: NORMAL
UTERINE ACTIVITY: NO

## 2017-05-31 PROCEDURE — 99213 OFFICE O/P EST LOW 20 MIN: CPT | Performed by: OBSTETRICS & GYNECOLOGY

## 2017-05-31 PROCEDURE — 59025 FETAL NON-STRESS TEST: CPT | Performed by: OBSTETRICS & GYNECOLOGY

## 2017-06-05 ENCOUNTER — ROUTINE PRENATAL (OUTPATIENT)
Dept: OBSTETRICS AND GYNECOLOGY | Facility: CLINIC | Age: 22
End: 2017-06-05

## 2017-06-05 ENCOUNTER — APPOINTMENT (OUTPATIENT)
Dept: PREADMISSION TESTING | Facility: HOSPITAL | Age: 22
End: 2017-06-05

## 2017-06-05 VITALS — BODY MASS INDEX: 37.31 KG/M2 | DIASTOLIC BLOOD PRESSURE: 80 MMHG | WEIGHT: 214 LBS | SYSTOLIC BLOOD PRESSURE: 112 MMHG

## 2017-06-05 VITALS — WEIGHT: 214 LBS | HEIGHT: 64 IN | BODY MASS INDEX: 36.54 KG/M2

## 2017-06-05 DIAGNOSIS — O24.113 TYPE 2 DIABETES MELLITUS COMPLICATING PREGNANCY, ANTEPARTUM, THIRD TRIMESTER: Primary | ICD-10-CM

## 2017-06-05 LAB
ABO GROUP BLD: NORMAL
ACCELERATIONS > 15 BPM: 4
ACOUSTIC STIMULATION: NO
ANION GAP SERPL CALCULATED.3IONS-SCNC: 5 MMOL/L (ref 3–11)
BLD GP AB SCN SERPL QL: NEGATIVE
BUN BLD-MCNC: 6 MG/DL (ref 9–23)
BUN/CREAT SERPL: 10 (ref 7–25)
CALCIUM SPEC-SCNC: 9.3 MG/DL (ref 8.7–10.4)
CHLORIDE SERPL-SCNC: 108 MMOL/L (ref 99–109)
CO2 SERPL-SCNC: 26 MMOL/L (ref 20–31)
CREAT BLD-MCNC: 0.6 MG/DL (ref 0.6–1.3)
DECELERATIONS: NORMAL
DEPRECATED RDW RBC AUTO: 43 FL (ref 37–54)
ERYTHROCYTE [DISTWIDTH] IN BLOOD BY AUTOMATED COUNT: 13.8 % (ref 11.3–14.5)
GFR SERPL CREATININE-BSD FRML MDRD: >150 ML/MIN/1.73
GLUCOSE BLD-MCNC: 86 MG/DL (ref 70–100)
HBA1C MFR BLD: 6.2 % (ref 4.8–5.6)
HCT VFR BLD AUTO: 35.4 % (ref 34.5–44)
HGB BLD-MCNC: 11.2 G/DL (ref 11.5–15.5)
MCH RBC QN AUTO: 27.1 PG (ref 27–31)
MCHC RBC AUTO-ENTMCNC: 31.6 G/DL (ref 32–36)
MCV RBC AUTO: 85.7 FL (ref 80–99)
NST ASSESSMENT: REACTIVE
NST BASELINE: 135
NST DURATION: 20 MINUTES
NST INDICATIONS: NORMAL
NST LOCATIONS: NORMAL
NST READ BY: NORMAL
NST RETURN: NORMAL
PLATELET # BLD AUTO: 142 10*3/MM3 (ref 150–450)
PMV BLD AUTO: 10.6 FL (ref 6–12)
POTASSIUM BLD-SCNC: 3.9 MMOL/L (ref 3.5–5.5)
RBC # BLD AUTO: 4.13 10*6/MM3 (ref 3.89–5.14)
RH BLD: POSITIVE
SODIUM BLD-SCNC: 139 MMOL/L (ref 132–146)
UTERINE ACTIVITY: YES
WBC NRBC COR # BLD: 4.55 10*3/MM3 (ref 4.5–13.5)

## 2017-06-05 PROCEDURE — 59025 FETAL NON-STRESS TEST: CPT | Performed by: OBSTETRICS & GYNECOLOGY

## 2017-06-05 PROCEDURE — 99213 OFFICE O/P EST LOW 20 MIN: CPT | Performed by: OBSTETRICS & GYNECOLOGY

## 2017-06-05 NOTE — PROGRESS NOTES
Chief Complaint   Patient presents with   • Routine Prenatal Visit     Pt. complains of numbness in her hands, also having gum pain from her teeth. Pt. just did her PAT's this morning and has C/S scheduled for tomorrow.        HPI: Jeanine is a  currently at 38w6d who today reports the following:Headache - No ; Visual change - No ; Swelling in legs - No ; Nausea - No ; Constipation - No; Diarrhea - No ; Contractions - No ; Leaking fluid - No ; Vaginal bleeding -  No.      ROS: A comprehensive review of systems was negative.  Vitals: See prenatal flowsheet     EXAM:  Abdomen: See prenatal flowsheet   Urine glucose/protein: See prenatal flowsheet   Pelvic: See prenatal flowsheet     Prenatal Labs  Lab Results   Component Value Date    HGB 11.2 (L) 2017    RUBELLAIGGIN Immune 2016    HEPBSAG Non-Reactive 2016    LABRPR Non-reactive 10/23/2015    ABORH B Rh Positive 2016    ABO B 2017    RH Positive 2017    ABSCRN Negative 2017    LABANTI Negative 10/23/2015    [id number] NonReactive 10/23/2015    QUC8LEL0 Non-Reactive 2016       MDM:  Impression:Previous C/S with planned repeat C/S and Diabetes mellitus  Tests done today: NST  and PAT  Specific topics discussed at today's visit: delivery, sterilzation  Next visit:none

## 2017-06-05 NOTE — DISCHARGE INSTRUCTIONS
What to know before your arrive:     -Do not eat, drink or chew gum after midnight the day before your procedure.    This also includes mints.   -Do not shave any part of your body including abdomen or pelvic are for two    days before your procedure.   -If you are taking a scheduled medication (insulin, blood pressure medicine,   antibiotics) please consult with your physician whether to take on the day of   surgery.   -Remove all jewelry including rings, wedding bands, and piercing before coming   to the hospital.   -Leave important valuables at home.   -Do not wear dark fingernail polish.   -Bring the following with you to the hospital:    -Picture ID and insurance, Medicare or Medicaid cards    -Co-pay/deductible required by insurance (Cash, Check, Credit Card)    -Copy of living will or power  document (if applicable)    -CPAP mask and tubing, not machine (if applicable)    -Skin prep instructions sheet    What to know the day of procedure:     -Park in the [facility], take elevator for first floor, exit to the right and  proceed through the doors to outside, follow the covered sidewalk to the  entrance of the [place], follow the hallway and signs to the [facility],  enter the [place] to your right BEFORE entering the 17[address] lobby.  Take the  elevators to the 3rd floor ([address] [place]).   -Leave unnecessary items in your vehicle, including your suitcase.  Your support  person or a family member can get it for you after your procedure.   -Check in at the reception desk in the lobby of the 3rd floor ([address] [place]).   -One person may accompany you to the pre-op/recovery area.  Please have  other family members wait in the waiting room.   -An anesthesiologist will meet with your prior to your procedure.   -After anesthesia has been initiated, one person may accompany you in the  operating room.   -No video cameras are permitted in the operating room; only still cameras,  Please.      What to  expect while you are in recovery:     -One person may stay with you while you are in recovery.   -If the baby is stable, he/she may visit to initiate breastfeeding & [employer].    THE FOLLOWING INFORMATION WAS PROVIDED TO PATIENT:     SECTION BOOKLET BY AGNES  PAIN MANAGEMENT   RESPIREX    CHLORHEXIDINE GLUCONATE WIPES AND INSTRUCTIONS GIVEN TO PATIENT

## 2017-06-06 ENCOUNTER — HOSPITAL ENCOUNTER (INPATIENT)
Facility: HOSPITAL | Age: 22
LOS: 3 days | Discharge: HOME | End: 2017-06-09
Attending: OBSTETRICS & GYNECOLOGY | Admitting: OBSTETRICS & GYNECOLOGY

## 2017-06-06 ENCOUNTER — ANESTHESIA (OUTPATIENT)
Dept: LABOR AND DELIVERY | Facility: HOSPITAL | Age: 22
End: 2017-06-06

## 2017-06-06 ENCOUNTER — ANESTHESIA EVENT (OUTPATIENT)
Dept: LABOR AND DELIVERY | Facility: HOSPITAL | Age: 22
End: 2017-06-06

## 2017-06-06 VITALS
SYSTOLIC BLOOD PRESSURE: 123 MMHG | HEART RATE: 87 BPM | RESPIRATION RATE: 1 BRPM | DIASTOLIC BLOOD PRESSURE: 64 MMHG | OXYGEN SATURATION: 100 %

## 2017-06-06 DIAGNOSIS — Z98.891 HISTORY OF CESAREAN DELIVERY: Primary | ICD-10-CM

## 2017-06-06 DIAGNOSIS — O24.113 TYPE 2 DIABETES MELLITUS AFFECTING PREGNANCY IN THIRD TRIMESTER, ANTEPARTUM: ICD-10-CM

## 2017-06-06 PROBLEM — Z3A.39 39 WEEKS GESTATION OF PREGNANCY: Status: ACTIVE | Noted: 2017-06-06

## 2017-06-06 LAB
GLUCOSE BLDC GLUCOMTR-MCNC: 77 MG/DL (ref 70–130)
GLUCOSE BLDC GLUCOMTR-MCNC: 80 MG/DL (ref 70–130)
GLUCOSE BLDC GLUCOMTR-MCNC: 91 MG/DL (ref 70–130)
GLUCOSE BLDC GLUCOMTR-MCNC: 94 MG/DL (ref 70–130)

## 2017-06-06 PROCEDURE — 58611 LIGATE OVIDUCT(S) ADD-ON: CPT | Performed by: OBSTETRICS & GYNECOLOGY

## 2017-06-06 PROCEDURE — 25010000002 METOCLOPRAMIDE PER 10 MG: Performed by: NURSE ANESTHETIST, CERTIFIED REGISTERED

## 2017-06-06 PROCEDURE — 88302 TISSUE EXAM BY PATHOLOGIST: CPT | Performed by: OBSTETRICS & GYNECOLOGY

## 2017-06-06 PROCEDURE — 25010000003 MORPHINE PER 10 MG: Performed by: NURSE ANESTHETIST, CERTIFIED REGISTERED

## 2017-06-06 PROCEDURE — 25010000002 FENTANYL CITRATE (PF) 100 MCG/2ML SOLUTION: Performed by: NURSE ANESTHETIST, CERTIFIED REGISTERED

## 2017-06-06 PROCEDURE — 82962 GLUCOSE BLOOD TEST: CPT

## 2017-06-06 PROCEDURE — 59514 CESAREAN DELIVERY ONLY: CPT | Performed by: OBSTETRICS & GYNECOLOGY

## 2017-06-06 PROCEDURE — 25010000003 CEFAZOLIN IN DEXTROSE 2-4 GM/100ML-% SOLUTION: Performed by: OBSTETRICS & GYNECOLOGY

## 2017-06-06 PROCEDURE — 25010000002 PHENYLEPHRINE PER 1 ML: Performed by: NURSE ANESTHETIST, CERTIFIED REGISTERED

## 2017-06-06 PROCEDURE — 59025 FETAL NON-STRESS TEST: CPT

## 2017-06-06 PROCEDURE — 25010000002 MIDAZOLAM PER 1 MG: Performed by: NURSE ANESTHETIST, CERTIFIED REGISTERED

## 2017-06-06 PROCEDURE — 0UB70ZZ EXCISION OF BILATERAL FALLOPIAN TUBES, OPEN APPROACH: ICD-10-PCS | Performed by: OBSTETRICS & GYNECOLOGY

## 2017-06-06 PROCEDURE — C1765 ADHESION BARRIER: HCPCS | Performed by: OBSTETRICS & GYNECOLOGY

## 2017-06-06 RX ORDER — IBUPROFEN 600 MG/1
600 TABLET ORAL EVERY 6 HOURS PRN
Status: DISCONTINUED | OUTPATIENT
Start: 2017-06-06 | End: 2017-06-09 | Stop reason: HOSPADM

## 2017-06-06 RX ORDER — DOCUSATE SODIUM 100 MG/1
100 CAPSULE, LIQUID FILLED ORAL 2 TIMES DAILY PRN
Status: DISCONTINUED | OUTPATIENT
Start: 2017-06-06 | End: 2017-06-09 | Stop reason: HOSPADM

## 2017-06-06 RX ORDER — HYDROCODONE BITARTRATE AND ACETAMINOPHEN 5; 325 MG/1; MG/1
1 TABLET ORAL EVERY 4 HOURS PRN
Status: DISCONTINUED | OUTPATIENT
Start: 2017-06-06 | End: 2017-06-09 | Stop reason: HOSPADM

## 2017-06-06 RX ORDER — DIPHENHYDRAMINE HCL 25 MG
25 CAPSULE ORAL EVERY 4 HOURS PRN
Status: DISCONTINUED | OUTPATIENT
Start: 2017-06-06 | End: 2017-06-09 | Stop reason: HOSPADM

## 2017-06-06 RX ORDER — TRISODIUM CITRATE DIHYDRATE AND CITRIC ACID MONOHYDRATE 500; 334 MG/5ML; MG/5ML
30 SOLUTION ORAL ONCE
Status: COMPLETED | OUTPATIENT
Start: 2017-06-06 | End: 2017-06-06

## 2017-06-06 RX ORDER — BUPIVACAINE HYDROCHLORIDE 7.5 MG/ML
INJECTION, SOLUTION EPIDURAL; RETROBULBAR AS NEEDED
Status: DISCONTINUED | OUTPATIENT
Start: 2017-06-06 | End: 2017-06-06 | Stop reason: SURG

## 2017-06-06 RX ORDER — SODIUM CHLORIDE 0.9 % (FLUSH) 0.9 %
1-10 SYRINGE (ML) INJECTION AS NEEDED
Status: DISCONTINUED | OUTPATIENT
Start: 2017-06-06 | End: 2017-06-09 | Stop reason: HOSPADM

## 2017-06-06 RX ORDER — LANOLIN 100 %
OINTMENT (GRAM) TOPICAL
Status: DISCONTINUED | OUTPATIENT
Start: 2017-06-06 | End: 2017-06-09 | Stop reason: HOSPADM

## 2017-06-06 RX ORDER — DIPHENHYDRAMINE HYDROCHLORIDE 50 MG/ML
25 INJECTION INTRAMUSCULAR; INTRAVENOUS EVERY 4 HOURS PRN
Status: DISCONTINUED | OUTPATIENT
Start: 2017-06-06 | End: 2017-06-09 | Stop reason: HOSPADM

## 2017-06-06 RX ORDER — OXYTOCIN 10 [USP'U]/ML
INJECTION, SOLUTION INTRAMUSCULAR; INTRAVENOUS AS NEEDED
Status: DISCONTINUED | OUTPATIENT
Start: 2017-06-06 | End: 2017-06-06 | Stop reason: SURG

## 2017-06-06 RX ORDER — HYDROMORPHONE HYDROCHLORIDE 1 MG/ML
0.5 INJECTION, SOLUTION INTRAMUSCULAR; INTRAVENOUS; SUBCUTANEOUS
Status: ACTIVE | OUTPATIENT
Start: 2017-06-06 | End: 2017-06-07

## 2017-06-06 RX ORDER — FENTANYL CITRATE 50 UG/ML
INJECTION, SOLUTION INTRAMUSCULAR; INTRAVENOUS AS NEEDED
Status: DISCONTINUED | OUTPATIENT
Start: 2017-06-06 | End: 2017-06-06 | Stop reason: SURG

## 2017-06-06 RX ORDER — MORPHINE SULFATE 0.5 MG/ML
INJECTION, SOLUTION EPIDURAL; INTRATHECAL; INTRAVENOUS AS NEEDED
Status: DISCONTINUED | OUTPATIENT
Start: 2017-06-06 | End: 2017-06-06 | Stop reason: SURG

## 2017-06-06 RX ORDER — PROMETHAZINE HYDROCHLORIDE 25 MG/ML
12.5 INJECTION, SOLUTION INTRAMUSCULAR; INTRAVENOUS EVERY 6 HOURS PRN
Status: DISCONTINUED | OUTPATIENT
Start: 2017-06-06 | End: 2017-06-09 | Stop reason: HOSPADM

## 2017-06-06 RX ORDER — CARBOPROST TROMETHAMINE 250 UG/ML
250 INJECTION, SOLUTION INTRAMUSCULAR AS NEEDED
Status: DISCONTINUED | OUTPATIENT
Start: 2017-06-06 | End: 2017-06-06 | Stop reason: HOSPADM

## 2017-06-06 RX ORDER — DEXTROSE MONOHYDRATE 25 G/50ML
25 INJECTION, SOLUTION INTRAVENOUS
Status: DISCONTINUED | OUTPATIENT
Start: 2017-06-06 | End: 2017-06-09 | Stop reason: HOSPADM

## 2017-06-06 RX ORDER — PROMETHAZINE HYDROCHLORIDE 25 MG/ML
12.5 INJECTION, SOLUTION INTRAMUSCULAR; INTRAVENOUS EVERY 6 HOURS PRN
Status: DISCONTINUED | OUTPATIENT
Start: 2017-06-06 | End: 2017-06-07

## 2017-06-06 RX ORDER — PROMETHAZINE HYDROCHLORIDE 12.5 MG/1
12.5 SUPPOSITORY RECTAL EVERY 6 HOURS PRN
Status: DISCONTINUED | OUTPATIENT
Start: 2017-06-06 | End: 2017-06-09 | Stop reason: HOSPADM

## 2017-06-06 RX ORDER — MIDAZOLAM HYDROCHLORIDE 1 MG/ML
INJECTION INTRAMUSCULAR; INTRAVENOUS AS NEEDED
Status: DISCONTINUED | OUTPATIENT
Start: 2017-06-06 | End: 2017-06-06 | Stop reason: SURG

## 2017-06-06 RX ORDER — OXYTOCIN/RINGER'S LACTATE 20/1000 ML
2 PLASTIC BAG, INJECTION (ML) INTRAVENOUS CONTINUOUS
Status: DISCONTINUED | OUTPATIENT
Start: 2017-06-06 | End: 2017-06-09 | Stop reason: HOSPADM

## 2017-06-06 RX ORDER — ONDANSETRON 2 MG/ML
4 INJECTION INTRAMUSCULAR; INTRAVENOUS ONCE AS NEEDED
Status: DISCONTINUED | OUTPATIENT
Start: 2017-06-06 | End: 2017-06-09 | Stop reason: HOSPADM

## 2017-06-06 RX ORDER — PROMETHAZINE HYDROCHLORIDE 25 MG/1
25 TABLET ORAL EVERY 6 HOURS PRN
Status: DISCONTINUED | OUTPATIENT
Start: 2017-06-06 | End: 2017-06-09 | Stop reason: HOSPADM

## 2017-06-06 RX ORDER — IBUPROFEN 600 MG/1
600 TABLET ORAL EVERY 6 HOURS PRN
Status: ACTIVE | OUTPATIENT
Start: 2017-06-06 | End: 2017-06-07

## 2017-06-06 RX ORDER — OXYCODONE HYDROCHLORIDE AND ACETAMINOPHEN 5; 325 MG/1; MG/1
1 TABLET ORAL EVERY 4 HOURS PRN
Status: DISCONTINUED | OUTPATIENT
Start: 2017-06-06 | End: 2017-06-09 | Stop reason: HOSPADM

## 2017-06-06 RX ORDER — LIDOCAINE HYDROCHLORIDE 10 MG/ML
5 INJECTION, SOLUTION INFILTRATION; PERINEURAL AS NEEDED
Status: DISCONTINUED | OUTPATIENT
Start: 2017-06-06 | End: 2017-06-06 | Stop reason: HOSPADM

## 2017-06-06 RX ORDER — MISOPROSTOL 200 UG/1
800 TABLET ORAL AS NEEDED
Status: DISCONTINUED | OUTPATIENT
Start: 2017-06-06 | End: 2017-06-06 | Stop reason: HOSPADM

## 2017-06-06 RX ORDER — CEFAZOLIN SODIUM 2 G/100ML
2 INJECTION, SOLUTION INTRAVENOUS ONCE
Status: COMPLETED | OUTPATIENT
Start: 2017-06-06 | End: 2017-06-06

## 2017-06-06 RX ORDER — MORPHINE SULFATE 2 MG/ML
2 INJECTION, SOLUTION INTRAMUSCULAR; INTRAVENOUS
Status: ACTIVE | OUTPATIENT
Start: 2017-06-06 | End: 2017-06-07

## 2017-06-06 RX ORDER — METOCLOPRAMIDE HYDROCHLORIDE 5 MG/ML
10 INJECTION INTRAMUSCULAR; INTRAVENOUS EVERY 4 HOURS PRN
Status: DISCONTINUED | OUTPATIENT
Start: 2017-06-06 | End: 2017-06-09 | Stop reason: HOSPADM

## 2017-06-06 RX ORDER — METHYLERGONOVINE MALEATE 0.2 MG/ML
200 INJECTION INTRAVENOUS ONCE AS NEEDED
Status: DISCONTINUED | OUTPATIENT
Start: 2017-06-06 | End: 2017-06-06 | Stop reason: HOSPADM

## 2017-06-06 RX ORDER — OXYCODONE AND ACETAMINOPHEN 7.5; 325 MG/1; MG/1
2 TABLET ORAL EVERY 4 HOURS PRN
Status: ACTIVE | OUTPATIENT
Start: 2017-06-06 | End: 2017-06-07

## 2017-06-06 RX ORDER — SODIUM CHLORIDE, SODIUM LACTATE, POTASSIUM CHLORIDE, CALCIUM CHLORIDE 600; 310; 30; 20 MG/100ML; MG/100ML; MG/100ML; MG/100ML
125 INJECTION, SOLUTION INTRAVENOUS CONTINUOUS
Status: DISCONTINUED | OUTPATIENT
Start: 2017-06-06 | End: 2017-06-06 | Stop reason: SDUPTHER

## 2017-06-06 RX ORDER — NICOTINE POLACRILEX 4 MG
15 LOZENGE BUCCAL
Status: DISCONTINUED | OUTPATIENT
Start: 2017-06-06 | End: 2017-06-09 | Stop reason: HOSPADM

## 2017-06-06 RX ORDER — SIMETHICONE 80 MG
80 TABLET,CHEWABLE ORAL
Status: DISCONTINUED | OUTPATIENT
Start: 2017-06-06 | End: 2017-06-09 | Stop reason: HOSPADM

## 2017-06-06 RX ORDER — ACETAMINOPHEN 325 MG/1
650 TABLET ORAL EVERY 4 HOURS PRN
Status: ACTIVE | OUTPATIENT
Start: 2017-06-06 | End: 2017-06-07

## 2017-06-06 RX ORDER — OXYCODONE HYDROCHLORIDE AND ACETAMINOPHEN 5; 325 MG/1; MG/1
2 TABLET ORAL EVERY 4 HOURS PRN
Status: DISPENSED | OUTPATIENT
Start: 2017-06-06 | End: 2017-06-07

## 2017-06-06 RX ORDER — NALOXONE HCL 0.4 MG/ML
0.4 VIAL (ML) INJECTION
Status: ACTIVE | OUTPATIENT
Start: 2017-06-06 | End: 2017-06-07

## 2017-06-06 RX ORDER — SODIUM CHLORIDE 0.9 % (FLUSH) 0.9 %
1-10 SYRINGE (ML) INJECTION AS NEEDED
Status: DISCONTINUED | OUTPATIENT
Start: 2017-06-06 | End: 2017-06-06 | Stop reason: HOSPADM

## 2017-06-06 RX ORDER — SODIUM CHLORIDE, SODIUM LACTATE, POTASSIUM CHLORIDE, CALCIUM CHLORIDE 600; 310; 30; 20 MG/100ML; MG/100ML; MG/100ML; MG/100ML
125 INJECTION, SOLUTION INTRAVENOUS CONTINUOUS
Status: DISCONTINUED | OUTPATIENT
Start: 2017-06-06 | End: 2017-06-09 | Stop reason: HOSPADM

## 2017-06-06 RX ADMIN — OXYTOCIN 30 UNITS: 10 INJECTION, SOLUTION INTRAMUSCULAR; INTRAVENOUS at 13:39

## 2017-06-06 RX ADMIN — SODIUM CHLORIDE, POTASSIUM CHLORIDE, SODIUM LACTATE AND CALCIUM CHLORIDE 125 ML/HR: 600; 310; 30; 20 INJECTION, SOLUTION INTRAVENOUS at 23:35

## 2017-06-06 RX ADMIN — SODIUM CHLORIDE, POTASSIUM CHLORIDE, SODIUM LACTATE AND CALCIUM CHLORIDE 125 ML/HR: 600; 310; 30; 20 INJECTION, SOLUTION INTRAVENOUS at 10:50

## 2017-06-06 RX ADMIN — PHENYLEPHRINE HYDROCHLORIDE 100 MCG: 10 INJECTION INTRAVENOUS at 13:26

## 2017-06-06 RX ADMIN — MORPHINE SULFATE 150 MCG: 0.5 INJECTION, SOLUTION EPIDURAL; INTRATHECAL; INTRAVENOUS at 13:16

## 2017-06-06 RX ADMIN — BUPIVACAINE HYDROCHLORIDE 1.3 ML: 7.5 INJECTION, SOLUTION EPIDURAL; RETROBULBAR at 13:16

## 2017-06-06 RX ADMIN — PHENYLEPHRINE HYDROCHLORIDE 100 MCG: 10 INJECTION INTRAVENOUS at 13:53

## 2017-06-06 RX ADMIN — PHENYLEPHRINE HYDROCHLORIDE 100 MCG: 10 INJECTION INTRAVENOUS at 13:23

## 2017-06-06 RX ADMIN — CEFAZOLIN SODIUM 2 G: 2 INJECTION, SOLUTION INTRAVENOUS at 12:57

## 2017-06-06 RX ADMIN — OXYCODONE AND ACETAMINOPHEN 1 TABLET: 5; 325 TABLET ORAL at 20:37

## 2017-06-06 RX ADMIN — MIDAZOLAM HYDROCHLORIDE 1 MG: 1 INJECTION, SOLUTION INTRAMUSCULAR; INTRAVENOUS at 13:11

## 2017-06-06 RX ADMIN — IBUPROFEN 600 MG: 600 TABLET, FILM COATED ORAL at 20:37

## 2017-06-06 RX ADMIN — FENTANYL CITRATE 15 MCG: 50 INJECTION, SOLUTION INTRAMUSCULAR; INTRAVENOUS at 13:16

## 2017-06-06 RX ADMIN — OXYTOCIN 30 UNITS: 10 INJECTION, SOLUTION INTRAMUSCULAR; INTRAVENOUS at 14:07

## 2017-06-06 RX ADMIN — PHENYLEPHRINE HYDROCHLORIDE 100 MCG: 10 INJECTION INTRAVENOUS at 13:36

## 2017-06-06 RX ADMIN — SODIUM CHLORIDE, POTASSIUM CHLORIDE, SODIUM LACTATE AND CALCIUM CHLORIDE 1000 ML: 600; 310; 30; 20 INJECTION, SOLUTION INTRAVENOUS at 12:58

## 2017-06-06 RX ADMIN — SODIUM CITRATE AND CITRIC ACID MONOHYDRATE 30 ML: 500; 334 SOLUTION ORAL at 13:08

## 2017-06-06 RX ADMIN — OXYCODONE AND ACETAMINOPHEN 1 TABLET: 5; 325 TABLET ORAL at 15:35

## 2017-06-06 RX ADMIN — SODIUM CHLORIDE, POTASSIUM CHLORIDE, SODIUM LACTATE AND CALCIUM CHLORIDE 1000 ML/HR: 600; 310; 30; 20 INJECTION, SOLUTION INTRAVENOUS at 12:25

## 2017-06-06 RX ADMIN — SODIUM CHLORIDE, POTASSIUM CHLORIDE, SODIUM LACTATE AND CALCIUM CHLORIDE: 600; 310; 30; 20 INJECTION, SOLUTION INTRAVENOUS at 13:39

## 2017-06-06 RX ADMIN — SODIUM CHLORIDE, POTASSIUM CHLORIDE, SODIUM LACTATE AND CALCIUM CHLORIDE: 600; 310; 30; 20 INJECTION, SOLUTION INTRAVENOUS at 14:07

## 2017-06-06 RX ADMIN — EPHEDRINE SULFATE 10 MG: 50 INJECTION INTRAMUSCULAR; INTRAVENOUS; SUBCUTANEOUS at 13:25

## 2017-06-06 RX ADMIN — METOCLOPRAMIDE 10 MG: 5 INJECTION, SOLUTION INTRAMUSCULAR; INTRAVENOUS at 18:28

## 2017-06-06 RX ADMIN — PHENYLEPHRINE HYDROCHLORIDE 100 MCG: 10 INJECTION INTRAVENOUS at 13:56

## 2017-06-06 NOTE — ANESTHESIA PROCEDURE NOTES
Spinal Block    Patient location during procedure: OR  Indication:at surgeon's request  Performed By  Anesthesiologist: CAROLINE MATOS  CRNA: KISHAN DANG  Preanesthetic Checklist  Completed: patient identified, surgical consent, pre-op evaluation, timeout performed, IV checked, risks and benefits discussed and monitors and equipment checked  Spinal Block Prep:  Patient Position:sitting  Sterile Tech:cap, gloves, mask and sterile barriers  Prep:Betadine  Patient Monitoring:blood pressure monitoring, continuous pulse oximetry and EKG  Spinal Block Procedure  Approach:midline  Guidance:palpation technique  Location:L3-L4  Needle Type:Maximo  Needle Gauge:25 G  Placement of Spinal needle event:cerebrospinal fluid aspirated  Paresthesia: no  Fluid Appearance:clear  Post Assessment  Patient Tolerance:patient tolerated the procedure well with no apparent complications  Complications no

## 2017-06-06 NOTE — OP NOTE
DATE OF PROCEDURE:  2017    PREOPERATIVE DIAGNOSES:  1. Intrauterine pregnancy at 39 weeks.  2. Prior  delivery.  3. Desires permanent surgical sterilization.   4. Pregestational diabetes.    POSTOPERATIVE DIAGNOSES:  1. Intrauterine pregnancy at 39 weeks.  2. Prior  delivery.  3. Desires permanent surgical sterilization.   4. Pregestational diabetes.    PROCEDURES PERFORMED:  1. Repeat low transverse  section.  2. Bilateral tubal ligation.    SURGEON: JOSE Thomas MD    ANESTHESIA: Spinal.    INDICATIONS: This patient is a 22-year-old female who is admitted for repeat  delivery after a prenatal course complicated by well-controlled pregestational diabetes. She further desires permanent surgical sterilization and was well counseled with the respect to alternatives to sterilization for both men and women, failure rate of approximately 1 in 200, as well as long and short term consequences of sterilization including regret.     DESCRIPTION OF PROCEDURE: The patient was taken to the operating room after the establishment of adequate regional anesthesia her abdomen was sterilely prepped and draped in the usual fashion. A Pfannenstiel approach was made in the abdomen excising the previous surgical scars and carried down to the subcutaneous tissue to the rectus abdominis fascia which was incised in the midline and extended bilaterally with Araya scissors. The fascia was dissected off of the muscle bellies of the rectus abdominis muscle superiorly and inferiorly with sharp dissection. The muscle bellies of the rectus were  with sharp dissection and the peritoneum was entered. The uterovesicular peritoneum was identified, grasped with forceps and dissected off the well-developed lower uterine segment. The bladder blade was then placed to protect the bladder. A low transverse uterine incision was made with the knife and extended bluntly. The infant was delivered from the  vertex presentation, suctioned on the abdomen and the cord was doubly clamped and cut, and the infant was handed to waiting  staff who performed the appropriate resuscitating maneuvers. Sex is male. Apgar scores are 8 at one minute, 8 at five minutes and 9 at ten minutes. Weight pending. Cord blood was obtained. The placenta was manually extracted intact, but appeared grossly normal and sent for disposal. The uterus was externalized and closed with #1 chromic in a running locking continuous fashion. The right fallopian tube was positively identified, doubly ligated at its midsection with 0-plain gut creating a tubal segment, which was excised and sent to pathology. The left fallopian tube was similarly positively identified. Its midsection was grasped with a Daxa clamp. The tubal segment was doubly ligated with 0-plain gut and excised with Metzenbaum scissors. The 2nd tubal ligation segment was also sent for pathology. The anterior and posterior cul-de-sacs were irrigated and suctioned, and the uterus was placed back in the abdominal cavity. The left and right pericolic gutters were well irrigated and suctioned. The uterine incision and tubal ligation sites were meticulously inspected with good hemostasis being noted. Interceed was placed over the uterine incision. The muscle bellies of the rectus abdominis muscle and peritoneum were tied together with 2-0 chromic in a running continuous fashion. The fascia was reapproximated using 0-Vicryl in a running continuous fashion in 2 segments. The subcutaneous tissue was reapproximated using 3-0 plain gut in a running continuous fashion and the skin edges were reapproximated using 4-0 Vicryl in a running subcuticular fashion. The patient tolerated the procedure well. She was taken to the recovery room in good condition.    ESTIMATED BLOOD LOSS: 700 mL    COMPLICATIONS: None.     TUBES AND DRAINS: Smyth catheter to gravity.    SPECIMENS: Bilateral tubal segments.        MD LAZARUS Terrazas/aide  DD: 06/06/2017 14:33:55  DT: 06/06/2017 16:46:32  Voice Rec. ID #[id number]  Voice Original ID #[id number]  Doc ID #[id number]  Rev. #0  cc:

## 2017-06-06 NOTE — ANESTHESIA PREPROCEDURE EVALUATION
Anesthesia Evaluation     Nursing notes reviewed          Airway   Mallampati: III  TM distance: >3 FB  Neck ROM: full  Dental      Pulmonary - negative pulmonary ROS   Cardiovascular - negative cardio ROS        Neuro/Psych  (+) numbness (bilateral hands),    GI/Hepatic/Renal/Endo    (+)  GERD, diabetes mellitus gestational,     Musculoskeletal (-) negative ROS    Abdominal    Substance History - negative use     OB/GYN    (+) Pregnant,         Other - negative ROS                                       Anesthesia Plan    ASA 3     spinal and ITN     Anesthetic plan and risks discussed with patient.

## 2017-06-06 NOTE — H&P
[facility]  Obstetric History and Physical    Chief Complaint   Patient presents with   • Scheduled        Subjective     Patient is a 22 y.o. female  currently at 39w0d, who presents for elective repeat  delivery with BTL.    Her prenatal care is complicated by  diabetes  pre-gestational.  Her previous obstetric/gynecological history is noted for is remarkable for .    The following portions of the patients history were reviewed and updated as appropriate: current medications, allergies, past medical history, past surgical history, past family history, past social history and problem list .       Prenatal Information:   Maternal Prenatal Labs  Blood Type ABO Type   Date Value Ref Range Status   2017 B  Final      Rh Status RH type   Date Value Ref Range Status   2017 Positive  Final      Antibody Screen Antibody Screen   Date Value Ref Range Status   2017 Negative  Final      Gonnorhea No results found for: GCCX   Chlamydia No results found for: CLAMYDCU   RPR No results found for: RPR   Syphilis Antibody No results found for: SYPHILIS   Rubella No results found for: RUBELLAIGGIN   Hepatitis B Surface Antigen No results found for: HEPBSAG   HIV-1 Antibody No results found for: LABHIV1   Hepatitis C Antibody No results found for: HEPCAB   Rapid Urin Drug Screen No results found for: AMPMETHU, BARBITSCNUR, LABBENZSCN, LABMETHSCN, LABOPIASCN, THCURSCR, COCAINEUR, AMPHETSCREEN, PROPOXSCN, BUPRENORSCNU, METAMPSCNUR, OXYCODONESCN, TRICYCLICSCN   Group B Strep Culture No results found for: GBSANTIGEN           External Prenatal Results         Pregnancy Outside Results - these were transcribed from office records.  See scanned records for details. Date Time   Hgb      Hct      ABO ^ B  16    Rh ^ Positive  16    Antibody Screen ^ Negative  16    Glucose Fasting GTT      Glucose Tolerance Test 1 hour      Glucose Tolerance Test 3 hour      Gonorrhea (discrete)       Chlamydia (discrete)      RPR ^ Non-Reactive  16    VDRL      Syphillis Antibody      Rubella ^ Immune  16    HBsAg ^ Negative  16    Herpes Simplex Virus PCR      Herpes Simplex VIrus Culture      HIV ^ Negative  16    Hep C RNA Quant PCR      Hep C Antibody      Urine Drug Screen ^ Negative  16    AFP      Group B Strep      GBS Susceptibility to Clindamycin      GBS Susceptibility to Eythromycin      Fetal Fibronectin      Genetic Testing, Maternal Blood             Legend: ^: Historical            Past OB History:       Obstetric History       T2      TAB0   SAB0   E0   M0   L2       # Outcome Date GA Lbr Dario/2nd Weight Sex Delivery Anes PTL Lv   3 Current            2 Term 16 39w0d  7 lb 3 oz (3.26 kg) F CS-LTranv  N Y   1 Term 14 41w0d  8 lb 7 oz (3.827 kg) F CS-Unspec  N Y      Complications: Fetal Intolerance,Status post induction of labor          Past Medical History: Past Medical History:   Diagnosis Date   • Diabetes mellitus 2015    DM2   • Gestational diabetes     2nd and 3 rd pregnancies only       Past Surgical History Past Surgical History:   Procedure Laterality Date   •  SECTION      ;       Family History: Family History   Problem Relation Age of Onset   • Diabetes Mother    • Cancer Maternal Grandmother       Social History:  reports that she has never smoked. She has never used smokeless tobacco.   reports that she does not drink alcohol.   reports that she does not use illicit drugs.                   General ROS Negative Findings:Headaches, Visual Changes, Epigastric pain, Anorexia, Nausia/Vomiting, ROM and Vaginal Bleeding    ROS      Objective       Vital Signs Range for the last 24 hours  Temperature: Temp:  [97.6 °F (36.4 °C)-97.9 °F (36.6 °C)] 97.6 °F (36.4 °C)   Temp Source: Temp src: Oral   BP: BP: (113-123)/(58-69) 118/60   Pulse: Heart Rate:  [84-96] 96   Respirations: Resp:  [1-18] 16   SPO2: SpO2:  [98 %-100  %] 100 %   O2 Amount (l/min):     O2 Devices     Weight: Weight:  [214 lb (97.1 kg)] 214 lb (97.1 kg)     Physical Examination:   General:   alert, appears stated age and cooperative   Skin:   normal   HEENT:     Lungs:   clear to auscultation bilaterally   Heart:   regular rate and rhythm, S1, S2 normal, no murmur, click, rub or gallop   Abdomen:  soft, non-tender; bowel sounds normal; no masses,  no organomegaly   Lower Extremeties    Pelvis:  Exam deferred.         Presentation: vertex   Cervix: Exam by:     Dilation:     Effacement:     Station:         Fetal Heart Rate Assessment   Method:     Beats/min: Fetal HR (Beats/Min): 130   Baseline:     Varibility: Fetal HR Variability: moderate (amplitude range 6 to 25 bpm)   Accels: Fetal HR Accelerations: absent   Decels: Fetal HR Decelerations: absent   Tracing Category:       Uterine Assessment   Method: Method: TOCO (external toco transducer)   Frequency (min):     Ctx Count in 10 min:     Duration:     Intensity: Contraction Intensity: no contractions   Intensity by IUPC:     Resting Tone: Uterine Resting Tone: soft by palpation   Resting Tone by IUPC:     Edmondson Units:       Laboratory Results: @jkgpqhib15@  Radiology Review:@lastrad@  Other Studies:    Assessment/Plan     Active Problems:    * No active hospital problems. *        Assessment:  1.  Intrauterine pregnancy at 39w0d weeks gestation with reactive fetal status.    2.  Prior  delivery  3. Desires sterilization; well counseled   4.  Pre gestational DM     Plan:  1. Repeat  delivery with BTL  2. Plan of care has been reviewed with patient.  3.  Risks, benefits of treatment plan have been discussed.  4.  All questions have been answered.  5      Ulices Thomas MD  2017  2:27 PM

## 2017-06-06 NOTE — BRIEF OP NOTE
SECTION REPEAT WITH TUBAL  Procedure Note    Jeanine Tabor  2017    Pre-op Diagnosis:   Type 2 diabetes mellitus affecting pregnancy in third trimester, antepartum [O24.113]   Prior  section  Desires sterilization  Post-op Diagnosis:     Post-Op Diagnosis Codes:     * Type 2 diabetes mellitus affecting pregnancy in third trimester, antepartum [O24.113]    Procedure/CPT® Codes:      Procedure(s):   SECTION REPEAT WITH TUBAL    Surgeon(s):  Ulices Thomas MD    Anesthesia: * No anesthesia type entered *    Staff:   Circulator: Emma Garcia RN  NICU Nurse: Reta Barros RN  Baby Nurse: Jyothi Anderson RN  OB TECH: Lisette Calles; Valorie Foss  Respiratory Therapist: Andrew Smith RRT    Estimated Blood Loss: 700 mL  Urine Voided: 150 mL    Specimens:                  ID Type Source Tests Collected by Time Destination   A :  Tissue Fallopian Tubes, Bilateral TISSUE EXAM Ulices Thomas MD 2017 1344          Drains:   Urethral Catheter 17 1325 100% silicone 16 10 10 (Active)           Findings: normal anatomy    Complications: none      Ulices Thomas MD     Date: 2017  Time: 2:22 PM

## 2017-06-06 NOTE — PLAN OF CARE
Problem: Patient Care Overview (Adult)  Goal: Plan of Care Review    17 1450   Coping/Psychosocial Response Interventions   Plan Of Care Reviewed With patient;significant other   Patient Care Overview   Progress no change       Goal: Adult Individualization and Mutuality  Outcome: Ongoing (interventions implemented as appropriate)  Goal: Discharge Needs Assessment  Outcome: Ongoing (interventions implemented as appropriate)    17 1450   Discharge Needs Assessment   Concerns To Be Addressed no discharge needs identified   Readmission Within The Last 30 Days no previous admission in last 30 days   Equipment Needed After Discharge none   Current Health   Anticipated Changes Related to Illness none         Problem:  Delivery (Adult,Obstetrics,Pediatric)  Goal: Signs and Symptoms of Listed Potential Problems Will be Absent or Manageable ( Delivery)  Outcome: Outcome(s) achieved Date Met:  17    Delivery   Problems Assessed ( Delivery) all   Problems Present ( Delivery) none

## 2017-06-06 NOTE — ANESTHESIA POSTPROCEDURE EVALUATION
Patient: Jeanine Tabor    Procedure Summary     Date Anesthesia Start Anesthesia Stop Room / Location    17 1310 1419 [facility]   [facility]       Procedure Diagnosis Surgeon Provider     SECTION REPEAT WITH TUBAL (N/A Abdomen) Type 2 diabetes mellitus affecting pregnancy in third trimester, antepartum  (Type 2 diabetes mellitus affecting pregnancy in third trimester, antepartum [O24.113]) MD Oscar Carlton MD          Anesthesia Type: spinal, ITN  Last vitals  /60 (17 1420)    Temp 97.6 °F (36.4 °C) (17 1420)    Pulse 96 (17 1420)   Resp 16 (17 1420)    SpO2   100     Post Anesthesia Care and Evaluation    Patient location during evaluation: bedside  Patient participation: complete - patient participated  Level of consciousness: awake and alert  Pain management: adequate  Airway patency: patent  Anesthetic complications: No anesthetic complications    Cardiovascular status: acceptable  Respiratory status: acceptable  Hydration status: acceptable

## 2017-06-07 LAB
BASOPHILS # BLD AUTO: 0.01 10*3/MM3 (ref 0–0.2)
BASOPHILS NFR BLD AUTO: 0.2 % (ref 0–1)
DEPRECATED RDW RBC AUTO: 42.5 FL (ref 37–54)
EOSINOPHIL # BLD AUTO: 0.01 10*3/MM3 (ref 0.1–0.3)
EOSINOPHIL NFR BLD AUTO: 0.2 % (ref 0–3)
ERYTHROCYTE [DISTWIDTH] IN BLOOD BY AUTOMATED COUNT: 13.8 % (ref 11.3–14.5)
GLUCOSE BLDC GLUCOMTR-MCNC: 108 MG/DL (ref 70–130)
GLUCOSE BLDC GLUCOMTR-MCNC: 130 MG/DL (ref 70–130)
GLUCOSE BLDC GLUCOMTR-MCNC: 155 MG/DL (ref 70–130)
HCT VFR BLD AUTO: 31.1 % (ref 34.5–44)
HGB BLD-MCNC: 10 G/DL (ref 11.5–15.5)
IMM GRANULOCYTES # BLD: 0.02 10*3/MM3 (ref 0–0.03)
IMM GRANULOCYTES NFR BLD: 0.3 % (ref 0–0.6)
LYMPHOCYTES # BLD AUTO: 1.08 10*3/MM3 (ref 0.6–4.8)
LYMPHOCYTES NFR BLD AUTO: 16.6 % (ref 24–44)
MCH RBC QN AUTO: 27.3 PG (ref 27–31)
MCHC RBC AUTO-ENTMCNC: 32.2 G/DL (ref 32–36)
MCV RBC AUTO: 85 FL (ref 80–99)
MONOCYTES # BLD AUTO: 0.52 10*3/MM3 (ref 0–1)
MONOCYTES NFR BLD AUTO: 8 % (ref 0–12)
NEUTROPHILS # BLD AUTO: 4.88 10*3/MM3 (ref 1.5–8.3)
NEUTROPHILS NFR BLD AUTO: 74.7 % (ref 41–71)
PLATELET # BLD AUTO: 135 10*3/MM3 (ref 150–450)
PMV BLD AUTO: 10.8 FL (ref 6–12)
RBC # BLD AUTO: 3.66 10*6/MM3 (ref 3.89–5.14)
WBC NRBC COR # BLD: 6.52 10*3/MM3 (ref 4.5–13.5)

## 2017-06-07 PROCEDURE — 85025 COMPLETE CBC W/AUTO DIFF WBC: CPT | Performed by: OBSTETRICS & GYNECOLOGY

## 2017-06-07 PROCEDURE — 82962 GLUCOSE BLOOD TEST: CPT

## 2017-06-07 PROCEDURE — 99231 SBSQ HOSP IP/OBS SF/LOW 25: CPT | Performed by: OBSTETRICS & GYNECOLOGY

## 2017-06-07 PROCEDURE — 63710000001 DIPHENHYDRAMINE PER 50 MG: Performed by: NURSE ANESTHETIST, CERTIFIED REGISTERED

## 2017-06-07 PROCEDURE — 63710000001 INSULIN REGULAR HUMAN PER 5 UNITS: Performed by: OBSTETRICS & GYNECOLOGY

## 2017-06-07 RX ADMIN — IBUPROFEN 600 MG: 600 TABLET, FILM COATED ORAL at 02:23

## 2017-06-07 RX ADMIN — DOCUSATE SODIUM 100 MG: 100 CAPSULE, LIQUID FILLED ORAL at 08:33

## 2017-06-07 RX ADMIN — DIPHENHYDRAMINE HYDROCHLORIDE 25 MG: 25 CAPSULE ORAL at 08:38

## 2017-06-07 RX ADMIN — IBUPROFEN 600 MG: 600 TABLET, FILM COATED ORAL at 08:34

## 2017-06-07 RX ADMIN — INSULIN HUMAN 2 UNITS: 100 INJECTION, SOLUTION PARENTERAL at 14:05

## 2017-06-07 RX ADMIN — OXYCODONE AND ACETAMINOPHEN 2 TABLET: 5; 325 TABLET ORAL at 02:23

## 2017-06-07 RX ADMIN — IBUPROFEN 600 MG: 600 TABLET, FILM COATED ORAL at 16:16

## 2017-06-07 RX ADMIN — DIPHENHYDRAMINE HYDROCHLORIDE 25 MG: 25 CAPSULE ORAL at 16:16

## 2017-06-07 RX ADMIN — SIMETHICONE CHEW TAB 80 MG 80 MG: 80 TABLET ORAL at 08:33

## 2017-06-07 RX ADMIN — DOCUSATE SODIUM 100 MG: 100 CAPSULE, LIQUID FILLED ORAL at 18:12

## 2017-06-07 RX ADMIN — OXYCODONE AND ACETAMINOPHEN 1 TABLET: 5; 325 TABLET ORAL at 16:16

## 2017-06-07 RX ADMIN — IBUPROFEN 600 MG: 600 TABLET, FILM COATED ORAL at 22:16

## 2017-06-07 RX ADMIN — DIPHENHYDRAMINE HYDROCHLORIDE 25 MG: 25 CAPSULE ORAL at 22:16

## 2017-06-07 RX ADMIN — OXYCODONE AND ACETAMINOPHEN 2 TABLET: 5; 325 TABLET ORAL at 08:38

## 2017-06-07 RX ADMIN — Medication 10 ML: at 08:33

## 2017-06-07 RX ADMIN — OXYCODONE AND ACETAMINOPHEN 1 TABLET: 5; 325 TABLET ORAL at 22:16

## 2017-06-07 NOTE — ANESTHESIA POSTPROCEDURE EVALUATION
Patient: Jeanine Tabor    Procedure Summary     Date Anesthesia Start Anesthesia Stop Room / Location    17 1310 1419 [facility]   [facility]       Procedure Diagnosis Surgeon Provider     SECTION REPEAT WITH TUBAL (N/A Abdomen) Type 2 diabetes mellitus affecting pregnancy in third trimester, antepartum  (Type 2 diabetes mellitus affecting pregnancy in third trimester, antepartum [O24.113]) MD Oscar Carlton MD          Anesthesia Type: spinal, ITN  Last vitals  BP      Temp      Pulse     Resp      SpO2        Post Anesthesia Care and Evaluation    Patient location during evaluation: bedside  Patient participation: complete - patient participated  Level of consciousness: awake and alert  Pain management: adequate  Airway patency: patent  Anesthetic complications: No anesthetic complications    Cardiovascular status: acceptable  Respiratory status: acceptable  Hydration status: acceptable  Post Neuraxial Block status: Motor and sensory function returned to baseline and No signs or symptoms of PDPH

## 2017-06-07 NOTE — PROGRESS NOTES
[facility]     2017    Name:Jeanine Tabor    MR#:[id number]     PROGRESS NOTE:  Post-Op day 1 S/P    HD:1    Subjective   22 y.o. yo Female  s/p CS at 39w0d doing well. Pain well controlled. Tolerating regular diet and having flatus. Lochia normal.     Patient Active Problem List   Diagnosis   • History of  delivery   • Short interval between pregnancies affecting pregnancy in first trimester, antepartum   • Family history of congenital heart defect   • 34 weeks gestation of pregnancy   • Type 2 diabetes mellitus affecting pregnancy in third trimester, antepartum   • 39 weeks gestation of pregnancy   •  delivery delivered        Objective    Vitals  Temp:  Temp:  [97.4 °F (36.3 °C)-98.2 °F (36.8 °C)] 98.2 °F (36.8 °C)  Temp src: Oral  BP:  BP: ()/(50-80) 137/80  Pulse:  Heart Rate:  [76-99] 99  RR:   Resp:  [1-18] 18    General Awake, alert, no distress  Abdomen Soft, non-distended, fundus firm, is below umbilicus, appropriately tender  Incision  Intact, no erythema or exudate  Extremities Calves NT bilaterally     I/O last 3 completed shifts:  In: 3800 [I.V.:3800]  Out: 1100 [Urine:400; Blood:700]    LABS:   Lab Results   Component Value Date    WBC 6.52 2017    HGB 10.0 (L) 2017    HCT 31.1 (L) 2017    MCV 85.0 2017     (L) 2017       Infant: male       Assessment   1.  POD 1  Section with BTL    Plan: Doing well.  Routine postoperative care      Active Problems:   None      Ulices Thomas MD  2017 9:30 AM

## 2017-06-07 NOTE — PLAN OF CARE
Problem: Patient Care Overview (Adult)  Goal: Plan of Care Review  Outcome: Ongoing (interventions implemented as appropriate)    17 1053   Coping/Psychosocial Response Interventions   Plan Of Care Reviewed With patient   Patient Care Overview   Progress improving       Goal: Adult Individualization and Mutuality  Outcome: Ongoing (interventions implemented as appropriate)  Goal: Discharge Needs Assessment  Outcome: Ongoing (interventions implemented as appropriate)    Problem: Postpartum ( Delivery) (Adult)  Goal: Signs and Symptoms of Listed Potential Problems Will be Absent or Manageable (Postpartum)  Outcome: Ongoing (interventions implemented as appropriate)

## 2017-06-07 NOTE — PLAN OF CARE
Problem: Postpartum ( Delivery) (Adult)  Goal: Signs and Symptoms of Listed Potential Problems Will be Absent or Manageable (Postpartum)  Outcome: Ongoing (interventions implemented as appropriate)  Pain controlled well this shift, no s/s of infection, V/S monitored this shift, bleeding WDL, incision without drainage.

## 2017-06-08 LAB
CYTO UR: NORMAL
GLUCOSE BLDC GLUCOMTR-MCNC: 122 MG/DL (ref 70–130)
GLUCOSE BLDC GLUCOMTR-MCNC: 158 MG/DL (ref 70–130)
GLUCOSE BLDC GLUCOMTR-MCNC: 169 MG/DL (ref 70–130)
GLUCOSE BLDC GLUCOMTR-MCNC: 97 MG/DL (ref 70–130)
LAB AP CASE REPORT: NORMAL
LAB AP CLINICAL INFORMATION: NORMAL
Lab: NORMAL
PATH REPORT.FINAL DX SPEC: NORMAL
PATH REPORT.GROSS SPEC: NORMAL

## 2017-06-08 PROCEDURE — 99231 SBSQ HOSP IP/OBS SF/LOW 25: CPT | Performed by: OBSTETRICS & GYNECOLOGY

## 2017-06-08 PROCEDURE — 82962 GLUCOSE BLOOD TEST: CPT

## 2017-06-08 RX ADMIN — IBUPROFEN 600 MG: 600 TABLET, FILM COATED ORAL at 06:49

## 2017-06-08 RX ADMIN — OXYCODONE AND ACETAMINOPHEN 1 TABLET: 5; 325 TABLET ORAL at 06:49

## 2017-06-08 RX ADMIN — IBUPROFEN 600 MG: 600 TABLET, FILM COATED ORAL at 18:24

## 2017-06-08 RX ADMIN — OXYCODONE AND ACETAMINOPHEN 1 TABLET: 5; 325 TABLET ORAL at 12:29

## 2017-06-08 RX ADMIN — INSULIN HUMAN 2 UNITS: 100 INJECTION, SOLUTION PARENTERAL at 00:16

## 2017-06-08 RX ADMIN — OXYCODONE AND ACETAMINOPHEN 1 TABLET: 5; 325 TABLET ORAL at 18:29

## 2017-06-08 RX ADMIN — DOCUSATE SODIUM 100 MG: 100 CAPSULE, LIQUID FILLED ORAL at 09:45

## 2017-06-08 RX ADMIN — SIMETHICONE CHEW TAB 80 MG 80 MG: 80 TABLET ORAL at 22:31

## 2017-06-08 RX ADMIN — INSULIN HUMAN 2 UNITS: 100 INJECTION, SOLUTION PARENTERAL at 12:23

## 2017-06-08 RX ADMIN — IBUPROFEN 600 MG: 600 TABLET, FILM COATED ORAL at 12:30

## 2017-06-08 RX ADMIN — OXYCODONE AND ACETAMINOPHEN 1 TABLET: 5; 325 TABLET ORAL at 22:31

## 2017-06-08 RX ADMIN — DOCUSATE SODIUM 100 MG: 100 CAPSULE, LIQUID FILLED ORAL at 18:29

## 2017-06-08 NOTE — PROGRESS NOTES
[facility]     2017    Name:Jeanine Tabor    MR#:[id number]     PROGRESS NOTE:  Post-Op day2 S/P    HD:2    Subjective   22 y.o. yo Female  s/p CS at 39w0d doing well. Pain well controlled. Tolerating regular diet and having flatus. Lochia normal.     Patient Active Problem List   Diagnosis   • History of  delivery   • Short interval between pregnancies affecting pregnancy in first trimester, antepartum   • Family history of congenital heart defect   • 34 weeks gestation of pregnancy   • Type 2 diabetes mellitus affecting pregnancy in third trimester, antepartum   • 39 weeks gestation of pregnancy   •  delivery delivered        Objective    Vitals  Temp:  Temp:  [97.9 °F (36.6 °C)-98.8 °F (37.1 °C)] 98.6 °F (37 °C)  Temp src: Oral  BP:  BP: (100-127)/(54-69) 115/69  Pulse:  Heart Rate:  [] 91  RR:   Resp:  [16-18] 18    General Awake, alert, no distress  Abdomen Soft, non-distended, fundus firm, is below umbilicus, appropriately tender  Incision  Intact, no erythema or exudate  Extremities Calves NT bilaterally     I/O last 3 completed shifts:  In: 1000 [I.V.:1000]  Out: 2100 [Urine:2100]    LABS:   Lab Results   Component Value Date    WBC 6.52 2017    HGB 10.0 (L) 2017    HCT 31.1 (L) 2017    MCV 85.0 2017     (L) 2017       Infant: male       Assessment   1.  POD 2 repeat c section BTL    Plan: Doing well.  Routine postoperative care      Active Problems:   None      Ulices Thomas MD  2017 7:50 AM

## 2017-06-08 NOTE — PLAN OF CARE
Problem: Patient Care Overview (Adult)  Goal: Plan of Care Review  Outcome: Ongoing (interventions implemented as appropriate)    17 0736   Coping/Psychosocial Response Interventions   Plan Of Care Reviewed With patient   Patient Care Overview   Progress improving   Outcome Evaluation   Outcome Summary/Follow up Plan VSS. Pt up ad marine. Pain controlled with meds. Incision and lochia WNL.       Goal: Adult Individualization and Mutuality  Outcome: Ongoing (interventions implemented as appropriate)  Goal: Discharge Needs Assessment  Outcome: Ongoing (interventions implemented as appropriate)    Problem: Postpartum ( Delivery) (Adult)  Goal: Signs and Symptoms of Listed Potential Problems Will be Absent or Manageable (Postpartum)  Outcome: Ongoing (interventions implemented as appropriate)

## 2017-06-08 NOTE — PLAN OF CARE
Problem: Patient Care Overview (Adult)  Goal: Plan of Care Review  Outcome: Ongoing (interventions implemented as appropriate)    17 1143   Coping/Psychosocial Response Interventions   Plan Of Care Reviewed With patient   Patient Care Overview   Progress improving       Goal: Adult Individualization and Mutuality  Outcome: Ongoing (interventions implemented as appropriate)  Goal: Discharge Needs Assessment  Outcome: Ongoing (interventions implemented as appropriate)    Problem: Postpartum ( Delivery) (Adult)  Goal: Signs and Symptoms of Listed Potential Problems Will be Absent or Manageable (Postpartum)  Outcome: Ongoing (interventions implemented as appropriate)

## 2017-06-09 VITALS
SYSTOLIC BLOOD PRESSURE: 116 MMHG | BODY MASS INDEX: 36.54 KG/M2 | WEIGHT: 214 LBS | HEIGHT: 64 IN | DIASTOLIC BLOOD PRESSURE: 59 MMHG | HEART RATE: 87 BPM | TEMPERATURE: 97.9 F | RESPIRATION RATE: 18 BRPM

## 2017-06-09 LAB
GLUCOSE BLDC GLUCOMTR-MCNC: 104 MG/DL (ref 70–130)
GLUCOSE BLDC GLUCOMTR-MCNC: 95 MG/DL (ref 70–130)
GLUCOSE BLDC GLUCOMTR-MCNC: 97 MG/DL (ref 70–130)

## 2017-06-09 PROCEDURE — 99238 HOSP IP/OBS DSCHRG MGMT 30/<: CPT | Performed by: OBSTETRICS & GYNECOLOGY

## 2017-06-09 PROCEDURE — 82962 GLUCOSE BLOOD TEST: CPT

## 2017-06-09 RX ORDER — OXYCODONE HYDROCHLORIDE AND ACETAMINOPHEN 5; 325 MG/1; MG/1
1 TABLET ORAL EVERY 4 HOURS PRN
Qty: 20 TABLET | Refills: 0 | Status: SHIPPED | OUTPATIENT
Start: 2017-06-09 | End: 2017-06-16

## 2017-06-09 RX ORDER — IBUPROFEN 600 MG/1
600 TABLET ORAL EVERY 6 HOURS PRN
Qty: 40 TABLET | Refills: 0 | Status: SHIPPED | OUTPATIENT
Start: 2017-06-09 | End: 2017-08-07

## 2017-06-09 RX ADMIN — IBUPROFEN 600 MG: 600 TABLET, FILM COATED ORAL at 02:27

## 2017-06-09 RX ADMIN — OXYCODONE AND ACETAMINOPHEN 1 TABLET: 5; 325 TABLET ORAL at 11:18

## 2017-06-09 RX ADMIN — IBUPROFEN 600 MG: 600 TABLET, FILM COATED ORAL at 11:18

## 2017-06-09 RX ADMIN — OXYCODONE AND ACETAMINOPHEN 1 TABLET: 5; 325 TABLET ORAL at 06:25

## 2017-06-09 RX ADMIN — DOCUSATE SODIUM 100 MG: 100 CAPSULE, LIQUID FILLED ORAL at 08:30

## 2017-06-09 RX ADMIN — OXYCODONE AND ACETAMINOPHEN 1 TABLET: 5; 325 TABLET ORAL at 02:27

## 2017-06-09 NOTE — DISCHARGE SUMMARY
Discharge Summary    Date of Admission: 2017  Date of Discharge:  2017      Patient: Jeanine Tabor      MR#:[id number]    Primary Surgeon/OB: Ulices Thomas MD    Discharge Surgeon/OB:Ulices Thomas    Presenting Problem/History of Present Illness  Type 2 diabetes mellitus affecting pregnancy in third trimester, antepartum [O24.113]  39 weeks gestation of pregnancy [Z3A.39]  39 weeks gestation of pregnancy [Z3A.39]     Patient Active Problem List   Diagnosis   • History of  delivery   • Short interval between pregnancies affecting pregnancy in first trimester, antepartum   • Family history of congenital heart defect   • 34 weeks gestation of pregnancy   • Type 2 diabetes mellitus affecting pregnancy in third trimester, antepartum   • 39 weeks gestation of pregnancy   •  delivery delivered         Discharge Diagnosis:  section at 39w0d    Procedures:  , Low Transverse     2017    1:38 PM      Feeding method: Breastfeeding Status: Unknown    Rh Immune globulin given: not applicable    Rubella vaccine given: no    Discharge Date: 2017; Discharge Time: noon    Early Discharge:  YES-I AUTHORIZE ONE MATERNAL- HOME VISIT    Hospital Course  Patient is a 22 y.o. female  at 39w0d status post  section with uneventful postoperative recovery.  Patient was advanced to regular diet on postoperative day#1.  On discharge, ambulating, tolerating a regular diet without any difficulties and her incision is dry, clean and intact.     Infant:   male  fetus 7 lb 10.9 oz (3.485 kg)  with Apgar scores of 8  , 8   at five minutes.    Circumcision: yes    Condition on Discharge:  Stable    Vital Signs  Temp:  [97.9 °F (36.6 °C)-98.4 °F (36.9 °C)] 97.9 °F (36.6 °C)  Heart Rate:  [80-98] 87  Resp:  [18-20] 18  BP: (110-133)/(58-76) 116/59    Lab Results   Component Value Date    WBC 6.52 2017    HGB 10.0 (L) 2017    HCT 31.1 (L) 2017    MCV 85.0  06/07/2017     (L) 06/07/2017       Discharge Disposition  Home or Self Care    Discharge Medications   Jeanine Tabor   Home Medication Instructions CHELITA:[id number]    Printed on:06/09/17 0737   Medication Information                      ACCU-CHEK FASTCLIX LANCETS misc  Finger sticks to be done four times daily             ibuprofen (ADVIL,MOTRIN) 600 MG tablet  Take 1 tablet by mouth Every 6 (Six) Hours As Needed for Mild Pain (1-3).             oxyCODONE-acetaminophen (PERCOCET) 5-325 MG per tablet  Take 1 tablet by mouth Every 4 (Four) Hours As Needed for Severe Pain (7-10) for up to 7 days.             Prenatal Vit-Fe Fumarate-FA (PRENATAL 27-1) 27-1 MG tablet tablet  Take 1 tablet by mouth Daily.                 Discharge Diet: consistent carb    Activity at Discharge: routine    Follow-up Appointments  Future Appointments  Date Time Provider Department Center   6/19/2017 3:30 PM Ulices Thomas MD [facility] None         Ulices Thomas MD  06/09/17  7:37 AM

## 2017-06-09 NOTE — PLAN OF CARE
Problem: Patient Care Overview (Adult)  Goal: Plan of Care Review  Outcome: Outcome(s) achieved Date Met:  06/09/17 06/09/17 0814   Coping/Psychosocial Response Interventions   Plan Of Care Reviewed With patient   Patient Care Overview   Progress improving

## 2017-06-09 NOTE — PLAN OF CARE
Problem: Patient Care Overview (Adult)  Goal: Discharge Needs Assessment  Outcome: Outcome(s) achieved Date Met:  17   Discharge Needs Assessment   Concerns To Be Addressed no discharge needs identified   Living Environment   Transportation Available family or friend will provide         Problem: Postpartum ( Delivery) (Adult)  Goal: Signs and Symptoms of Listed Potential Problems Will be Absent or Manageable (Postpartum)  Outcome: Outcome(s) achieved Date Met:  17   Postpartum ( Delivery)   Problems Assessed (Postpartum ) all   Problems Present (Postpartum ) none         17   Postpartum ( Delivery)   Problems Assessed (Postpartum ) all   Problems Present (Postpartum ) none

## 2017-06-09 NOTE — PLAN OF CARE
Problem: Patient Care Overview (Adult)  Goal: Adult Individualization and Mutuality  Outcome: Outcome(s) achieved Date Met:  06/09/17 06/08/17 0736   Individualization   Patient Specific Preferences bottlefeed baby   Patient Specific Goals adequate pain control   Patient Specific Interventions offer pain meds when due

## 2017-06-29 ENCOUNTER — POSTPARTUM VISIT (OUTPATIENT)
Dept: OBSTETRICS AND GYNECOLOGY | Facility: CLINIC | Age: 22
End: 2017-06-29

## 2017-06-29 VITALS
HEIGHT: 64 IN | SYSTOLIC BLOOD PRESSURE: 120 MMHG | BODY MASS INDEX: 32.95 KG/M2 | HEART RATE: 72 BPM | DIASTOLIC BLOOD PRESSURE: 66 MMHG | WEIGHT: 193 LBS

## 2017-06-29 DIAGNOSIS — Z98.890 POST-OPERATIVE STATE: Primary | ICD-10-CM

## 2017-06-29 PROCEDURE — 0503F POSTPARTUM CARE VISIT: CPT | Performed by: OBSTETRICS & GYNECOLOGY

## 2017-08-07 ENCOUNTER — POSTPARTUM VISIT (OUTPATIENT)
Dept: OBSTETRICS AND GYNECOLOGY | Facility: CLINIC | Age: 22
End: 2017-08-07

## 2017-08-07 VITALS
BODY MASS INDEX: 32.61 KG/M2 | HEART RATE: 72 BPM | SYSTOLIC BLOOD PRESSURE: 118 MMHG | HEIGHT: 64 IN | WEIGHT: 191 LBS | DIASTOLIC BLOOD PRESSURE: 68 MMHG

## 2017-08-07 NOTE — PROGRESS NOTES
"Subjective     Chief Complaint   Patient presents with   • Postpartum Care     6 weeks postpartum with BTROOSEVELT CRUZ Leander is a 22 y.o. year old  presenting to be seen for her post partum exam      Her LMP was Patient's last menstrual period was 2017..  Her cycles are regular, predictable and consistent every 28 - 32 days.  Usually her flow is typically normal with no more than 0 days of heavy flow each menses.   Additionally she describes pelvic pain (mild) associated with her menses.    She is not sexually active.  In the past 12 months there have not been new sexual partners.  Condoms are not typically used.  She would not like to be screened for STD's at today's exam.     Current contraceptive methods being used: tubal ligation.  In the coming year, she is not considering changing her current contraceptive method.    She exercises regularly: no.  She wears her seat belt: yes.  She has concerns about domestic violence: not asked.    GYN screening history:  · Last pap: she reports her last PAP was normal.    No Additional Complaints Reported    The following portions of the patient's history were reviewed and updated as appropriate:vital signs, allergies, current medications, past medical history, past social history, past surgical history and problem list.    Review of Systems  A comprehensive review of systems was negative.     Physical Exam    Objective     /68  Pulse 72  Ht 63.5\" (161.3 cm)  Wt 191 lb (86.6 kg)  LMP 2017  Breastfeeding? No  BMI 33.3 kg/m2      General:  well developed; well nourished  no acute distress  obese - Body mass index is 33.3 kg/(m^2).   Constitutional: healthy   Skin:  No suspicious lesions seen   Thyroid: normal to inspection and palpation   Lungs:  breathing is unlabored  clear to auscultation bilaterally   Heart:  regular rate and rhythm, S1, S2 normal, no murmur, click, rub or gallop   Breasts:  Not performed.   Abdomen: soft, non-tender; no " masses  no umbilical or inginual hernias are present  no hepato-splenomegaly   Pelvis: Clinical staff was present for exam  External genitalia:  normal appearance of the external genitalia including Bartholin's and [place]'s glands.  :  urethral meatus normal; urethral hypermobility is absent.  Vaginal:  normal pink mucosa without prolapse or lesions.  Cervix:  normal appearance  Uterus:  normal size, shape and consistency anteverted; fully involuted  Adnexa:  normal bimanual exam of the adnexa.   Musculoskeletal: negative   Neuro: normal without focal findings, mental status, speech normal, alert and oriented x3 and NICA   Psych: oriented to time, place and person, mood and affect are within normal limits, pt is a good historian; no memory problems were noted       Lab Review   No data reviewed    Imaging  No data reviewed    Assessment/Plan     ASSESSMENT  1. Postpartum care and examination    Annual pap    PLAN  No orders of the defined types were placed in this encounter.    No orders of the defined types were placed in this encounter.        Follow up: 1 week(s)         This note was electronically signed.    Ulices Thomas MD  August 7, 2017

## 2021-01-12 ENCOUNTER — TELEPHONE (OUTPATIENT)
Dept: OBSTETRICS AND GYNECOLOGY | Facility: CLINIC | Age: 26
End: 2021-01-12

## 2021-01-12 NOTE — TELEPHONE ENCOUNTER
DR. VELÁZQUEZ PT.   PT. STATES SHE IS HAVING SPOT BLEEDING AND A CLOT EVER SO OFTER. SHE WANTS TO KNOW IF SHE NEEDS TO COME IN BEFORE HER NEXT SCHED. APPT.

## 2021-01-12 NOTE — TELEPHONE ENCOUNTER
Patient states she has been having normal menses until this month spot bleeding and large clot.  Patient questioning if she should come to appointment sooner.  Patient advised to monitor menses if worse before appointment to call office

## 2021-03-15 ENCOUNTER — OFFICE VISIT (OUTPATIENT)
Dept: OBSTETRICS AND GYNECOLOGY | Facility: CLINIC | Age: 26
End: 2021-03-15

## 2021-03-15 VITALS
WEIGHT: 174 LBS | HEIGHT: 63 IN | DIASTOLIC BLOOD PRESSURE: 62 MMHG | BODY MASS INDEX: 30.83 KG/M2 | TEMPERATURE: 97.5 F | SYSTOLIC BLOOD PRESSURE: 118 MMHG

## 2021-03-15 DIAGNOSIS — Z01.419 WOMEN'S ANNUAL ROUTINE GYNECOLOGICAL EXAMINATION: Primary | ICD-10-CM

## 2021-03-15 PROBLEM — Z98.890 POST-OPERATIVE STATE: Status: RESOLVED | Noted: 2017-06-29 | Resolved: 2021-03-15

## 2021-03-15 PROBLEM — Z3A.39 39 WEEKS GESTATION OF PREGNANCY: Status: RESOLVED | Noted: 2017-06-06 | Resolved: 2021-03-15

## 2021-03-15 PROBLEM — Z3A.34 34 WEEKS GESTATION OF PREGNANCY: Status: RESOLVED | Noted: 2017-03-28 | Resolved: 2021-03-15

## 2021-03-15 PROCEDURE — 99385 PREV VISIT NEW AGE 18-39: CPT | Performed by: OBSTETRICS & GYNECOLOGY
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
## 2021-03-18 DIAGNOSIS — Z01.419 WOMEN'S ANNUAL ROUTINE GYNECOLOGICAL EXAMINATION: ICD-10-CM

## 2022-09-01 ENCOUNTER — OFFICE VISIT (OUTPATIENT)
Dept: OBSTETRICS AND GYNECOLOGY | Facility: CLINIC | Age: 27
End: 2022-09-01

## 2022-09-01 VITALS
HEIGHT: 63 IN | WEIGHT: 170 LBS | DIASTOLIC BLOOD PRESSURE: 72 MMHG | SYSTOLIC BLOOD PRESSURE: 100 MMHG | BODY MASS INDEX: 30.12 KG/M2

## 2022-09-01 DIAGNOSIS — Z01.419 WOMEN'S ANNUAL ROUTINE GYNECOLOGICAL EXAMINATION: Primary | ICD-10-CM

## 2022-09-01 DIAGNOSIS — Z69.81 PATIENT COUNSELED AS VICTIM OF DOMESTIC VIOLENCE: ICD-10-CM

## 2022-09-01 DIAGNOSIS — Z11.3 SCREEN FOR STD (SEXUALLY TRANSMITTED DISEASE): ICD-10-CM

## 2022-09-01 PROCEDURE — 3008F BODY MASS INDEX DOCD: CPT | Performed by: OBSTETRICS & GYNECOLOGY

## 2022-09-01 PROCEDURE — 2014F MENTAL STATUS ASSESS: CPT | Performed by: OBSTETRICS & GYNECOLOGY

## 2022-09-01 PROCEDURE — 99395 PREV VISIT EST AGE 18-39: CPT | Performed by: OBSTETRICS & GYNECOLOGY
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
## 2022-09-07 ENCOUNTER — CLINICAL SUPPORT (OUTPATIENT)
Dept: OBSTETRICS AND GYNECOLOGY | Facility: CLINIC | Age: 27
End: 2022-09-07

## 2022-09-07 DIAGNOSIS — Z20.2 EXPOSURE TO STD: ICD-10-CM

## 2022-09-07 DIAGNOSIS — A54.03 GONOCOCCAL CERVICITIS: Primary | ICD-10-CM

## 2022-09-07 PROCEDURE — 96372 THER/PROPH/DIAG INJ SC/IM: CPT | Performed by: STUDENT IN AN ORGANIZED HEALTH CARE EDUCATION/TRAINING PROGRAM

## 2022-09-07 RX ORDER — AZITHROMYCIN 1 G
1 PACKET (EA) ORAL ONCE
Qty: 1 PACKET | Refills: 0 | Status: SHIPPED | OUTPATIENT
Start: 2022-09-07 | End: 2022-09-07

## 2022-09-07 RX ORDER — METRONIDAZOLE 500 MG/1
500 TABLET ORAL 2 TIMES DAILY
Qty: 10 TABLET | Refills: 0 | Status: SHIPPED | OUTPATIENT
Start: 2022-09-07 | End: 2022-09-12

## 2022-09-07 RX ORDER — CEFTRIAXONE SODIUM 250 MG/1
250 INJECTION, POWDER, FOR SOLUTION INTRAMUSCULAR; INTRAVENOUS ONCE
Status: COMPLETED | OUTPATIENT
Start: 2022-09-07 | End: 2022-09-07

## 2022-09-07 RX ADMIN — CEFTRIAXONE SODIUM 250 MG: 250 INJECTION, POWDER, FOR SOLUTION INTRAMUSCULAR; INTRAVENOUS at 12:02

## 2022-10-10 ENCOUNTER — OFFICE VISIT (OUTPATIENT)
Dept: OBSTETRICS AND GYNECOLOGY | Facility: CLINIC | Age: 27
End: 2022-10-10

## 2022-10-10 VITALS
BODY MASS INDEX: 29.41 KG/M2 | HEIGHT: 63 IN | DIASTOLIC BLOOD PRESSURE: 66 MMHG | SYSTOLIC BLOOD PRESSURE: 102 MMHG | WEIGHT: 166 LBS

## 2022-10-10 DIAGNOSIS — A56.09 CHLAMYDIAL CERVICITIS: Primary | ICD-10-CM

## 2022-10-10 DIAGNOSIS — A54.03 GONOCOCCAL CERVICITIS: ICD-10-CM

## 2022-10-10 PROCEDURE — 99213 OFFICE O/P EST LOW 20 MIN: CPT | Performed by: OBSTETRICS & GYNECOLOGY

## 2022-10-10 RX ORDER — PENICILLIN V POTASSIUM 500 MG/1
TABLET ORAL
COMMUNITY
Start: 2022-09-29

## 2022-10-10 NOTE — PROGRESS NOTES
Subjective   Chief Complaint   Patient presents with   • Follow-up     Test of cure     Jeanine Tabor is a 27 y.o. year old .  No LMP recorded.  She presents for test of cure for gonorrhea and chlamydia cervicitis.  She reports being compliant with medication.  She reports sexual partners have been treated.    OTHER THINGS SHE WANTS TO DISCUSS TODAY:  She was recently treated with other antibiotics and complains of a yeast infection.  A prescription is pending    The following portions of the patient's history were reviewed and updated as appropriate:current medications and allergies    Social History    Tobacco Use      Smoking status: Never      Smokeless tobacco: Never    Review of Systems  Constitutional POS: nothing reported    NEG: anorexia or night sweats   Genitourinary POS: nothing reported    NEG: dysuria or hematuria   Gastointestinal POS: nothing reported    NEG: bloating, change in bowel habits, melena or reflux symptoms   Integument POS: nothing reported    NEG: moles that are changing in size, shape, color or rashes   Breast POS: nothing reported    NEG: persistent breast lump, skin dimpling or nipple discharge         Objective   There were no vitals taken for this visit.    General:  well developed; well nourished  no acute distress   Skin:  No suspicious lesions seen   Thyroid: normal to inspection and palpation   Lungs:  breathing is unlabored   Heart:  Not performed.   Breasts:  Not performed.   Abdomen: soft, non-tender; no masses  no umbilical or inguinal hernias are present  no hepato-splenomegaly   Pelvis: Clinical staff was present for exam  External genitalia:  normal appearance of the external genitalia including Bartholin's and [place]'s glands.  :  urethral meatus normal;  Vaginal:  normal pink mucosa without prolapse or lesions. discharge present -  white, thick and Culture for test of cure done;  Cervix:  normal appearance.     Lab Review   STD swabs for GC, chlamydia and  trichimoniasis    Imaging   No data reviewed        Assessment   1. Chlamydia cervicitis gonococcal cervicitis treated, yeast vaginitis, treatment pending     Plan   1. Check results of test of cure culture  2. The importance of keeping all planned follow-up and taking all medications as prescribed was emphasized.  3. Follow up for annual exam 1 year    No orders of the defined types were placed in this encounter.           Electronically signed by Ulices Thomas MD, 10/18/22, 1:48 PM EDT.

## 2022-10-13 ENCOUNTER — TELEPHONE (OUTPATIENT)
Dept: OBSTETRICS AND GYNECOLOGY | Facility: CLINIC | Age: 27
End: 2022-10-13

## 2022-10-13 RX ORDER — METRONIDAZOLE 500 MG/1
500 TABLET ORAL 2 TIMES DAILY
Qty: 10 TABLET | Refills: 0 | Status: SHIPPED | OUTPATIENT
Start: 2022-10-13 | End: 2022-10-18

## 2022-12-08 ENCOUNTER — TELEPHONE (OUTPATIENT)
Dept: OBSTETRICS AND GYNECOLOGY | Facility: CLINIC | Age: 27
End: 2022-12-08

## 2022-12-08 DIAGNOSIS — R30.0 DYSURIA: Primary | ICD-10-CM

## 2022-12-08 RX ORDER — NITROFURANTOIN 25; 75 MG/1; MG/1
100 CAPSULE ORAL 2 TIMES DAILY
Qty: 14 CAPSULE | Refills: 0 | Status: SHIPPED | OUTPATIENT
Start: 2022-12-08 | End: 2022-12-15

## 2022-12-08 NOTE — TELEPHONE ENCOUNTER
Dr. Thomas     Patient believes she has a UTI. She wants to know if she needs to be seen by provider to get medicine for this. Please advise.

## 2022-12-08 NOTE — TELEPHONE ENCOUNTER
Called and spoke with patient, she states that she is experiencing an increase in urination frequency and some burning when urinating.  She denies any abnormal discharge or odor and denies any vaginal irritation.  Patient would like to request medication.

## 2022-12-08 NOTE — TELEPHONE ENCOUNTER
"\"Please advise to get CCUA (ordered) before beginning antibiotic which was sent in\"    Called and advised patient accordingly, she verified understanding.   "

## 2023-04-03 ENCOUNTER — TELEPHONE (OUTPATIENT)
Dept: OBSTETRICS AND GYNECOLOGY | Facility: CLINIC | Age: 28
End: 2023-04-03
Payer: COMMERCIAL

## 2023-04-03 NOTE — TELEPHONE ENCOUNTER
Patient called, stating she went to the [facility] ED on Friday thinking she had a really bad yeast infection but was informed she as herpes. Patient states she is itching badly, very irritated and there are bumps. Patient states she just wants a second opinion or would like test ran. Please advise

## 2023-04-03 NOTE — TELEPHONE ENCOUNTER
HUB/:  Okay to schedule patient for gyn f/u for evaluation.     Called attempting to reach patient; no answer, LVM requesting call back and provided office call back number.

## 2023-04-05 NOTE — TELEPHONE ENCOUNTER
Called attempting to reach patient; no answer, LVM requesting call back and provided office call back number.    HUB/:  Patient is okay to just get scheduled for gyn f/u slot for evaluation.    This is a 3rd attempt to reach patient, no success, letter has been sent and this encounter will be completed.

## (undated) DEVICE — COATED VICRYL  (POLYGLACTIN 910) SUTURE, VIOLET BRAIDED, STERILE, SYNTHETIC ABSORBABLE SUTURE: Brand: COATED VICRYL

## (undated) DEVICE — PK C/SECT 10

## (undated) DEVICE — TRY SPINE BLCK WHITACRE 25G 3X5IN

## (undated) DEVICE — SUT GUT CHRM 1 CTX 36IN 905H

## (undated) DEVICE — SOL IRR H2O BTL 1000ML STRL

## (undated) DEVICE — SUT VIC 4/0 KS 27IN VCP662H

## (undated) DEVICE — INTERDRY. MOISTURE WICKING FABRIC WITH ANTIMICROBIAL SILVER. 144 IN BY 10 IN: Brand: INTERDRY

## (undated) DEVICE — GLV SURG BIOGEL LTX PF 7 1/2

## (undated) DEVICE — 39" SINGLE PATIENT USE HOVERMATT: Brand: SINGLE PATIENT USE HOVERMATT

## (undated) DEVICE — SYS SKIN CLS DERMABOND PRINEO W/22CM MESH TP

## (undated) DEVICE — SOL IRR NACL 0.9PCT BT 1000ML

## (undated) DEVICE — Device